# Patient Record
Sex: FEMALE | Race: WHITE | Employment: OTHER | URBAN - METROPOLITAN AREA
[De-identification: names, ages, dates, MRNs, and addresses within clinical notes are randomized per-mention and may not be internally consistent; named-entity substitution may affect disease eponyms.]

---

## 2017-01-18 DIAGNOSIS — I10 ESSENTIAL HYPERTENSION, MALIGNANT: ICD-10-CM

## 2017-01-18 DIAGNOSIS — I51.9 MYXEDEMA HEART DISEASE: Primary | ICD-10-CM

## 2017-01-18 DIAGNOSIS — E03.9 MYXEDEMA HEART DISEASE: Primary | ICD-10-CM

## 2017-01-18 DIAGNOSIS — E55.9 VITAMIN D DEFICIENCY: ICD-10-CM

## 2017-05-19 ENCOUNTER — HOSPITAL ENCOUNTER (EMERGENCY)
Facility: CLINIC | Age: 82
Discharge: HOME | End: 2017-05-20
Attending: EMERGENCY MEDICINE | Admitting: EMERGENCY MEDICINE
Payer: COMMERCIAL

## 2017-05-19 ENCOUNTER — APPOINTMENT (OUTPATIENT)
Dept: GENERAL RADIOLOGY | Facility: CLINIC | Age: 82
End: 2017-05-19
Attending: EMERGENCY MEDICINE
Payer: COMMERCIAL

## 2017-05-19 VITALS
OXYGEN SATURATION: 91 % | DIASTOLIC BLOOD PRESSURE: 96 MMHG | RESPIRATION RATE: 22 BRPM | TEMPERATURE: 97.8 F | SYSTOLIC BLOOD PRESSURE: 147 MMHG | HEIGHT: 66 IN | WEIGHT: 148 LBS | BODY MASS INDEX: 23.78 KG/M2

## 2017-05-19 DIAGNOSIS — J20.9 ACUTE BRONCHITIS, UNSPECIFIED ORGANISM: ICD-10-CM

## 2017-05-19 DIAGNOSIS — R30.0 DYSURIA: ICD-10-CM

## 2017-05-19 LAB
ALBUMIN SERPL-MCNC: 3.7 G/DL (ref 3.4–5)
ALBUMIN UR-MCNC: NEGATIVE MG/DL
ALP SERPL-CCNC: 61 U/L (ref 40–150)
ALT SERPL W P-5'-P-CCNC: 20 U/L (ref 0–50)
ANION GAP SERPL CALCULATED.3IONS-SCNC: 11 MMOL/L (ref 3–14)
APPEARANCE UR: CLEAR
AST SERPL W P-5'-P-CCNC: 27 U/L (ref 0–45)
BACTERIA #/AREA URNS HPF: ABNORMAL /HPF
BASOPHILS # BLD AUTO: 0.1 10E9/L (ref 0–0.2)
BASOPHILS NFR BLD AUTO: 0.9 %
BILIRUB SERPL-MCNC: 0.3 MG/DL (ref 0.2–1.3)
BILIRUB UR QL STRIP: NEGATIVE
BUN SERPL-MCNC: 21 MG/DL (ref 7–30)
CALCIUM SERPL-MCNC: 8.7 MG/DL (ref 8.5–10.1)
CHLORIDE SERPL-SCNC: 104 MMOL/L (ref 94–109)
CO2 SERPL-SCNC: 27 MMOL/L (ref 20–32)
COLOR UR AUTO: ABNORMAL
CREAT SERPL-MCNC: 1.07 MG/DL (ref 0.52–1.04)
CRP SERPL-MCNC: 48.3 MG/L (ref 0–8)
DIFFERENTIAL METHOD BLD: ABNORMAL
EOSINOPHIL # BLD AUTO: 0.1 10E9/L (ref 0–0.7)
EOSINOPHIL NFR BLD AUTO: 1.3 %
ERYTHROCYTE [DISTWIDTH] IN BLOOD BY AUTOMATED COUNT: 12.5 % (ref 10–15)
GFR SERPL CREATININE-BSD FRML MDRD: 49 ML/MIN/1.7M2
GLUCOSE SERPL-MCNC: 169 MG/DL (ref 70–99)
GLUCOSE UR STRIP-MCNC: NEGATIVE MG/DL
HCT VFR BLD AUTO: 33.6 % (ref 35–47)
HGB BLD-MCNC: 10.7 G/DL (ref 11.7–15.7)
HGB UR QL STRIP: ABNORMAL
IMM GRANULOCYTES # BLD: 0 10E9/L (ref 0–0.4)
IMM GRANULOCYTES NFR BLD: 0.1 %
KETONES UR STRIP-MCNC: NEGATIVE MG/DL
LACTATE BLD-SCNC: 3 MMOL/L (ref 0.7–2.1)
LEUKOCYTE ESTERASE UR QL STRIP: ABNORMAL
LYMPHOCYTES # BLD AUTO: 1.7 10E9/L (ref 0.8–5.3)
LYMPHOCYTES NFR BLD AUTO: 24.1 %
MCH RBC QN AUTO: 31.9 PG (ref 26.5–33)
MCHC RBC AUTO-ENTMCNC: 31.8 G/DL (ref 31.5–36.5)
MCV RBC AUTO: 100 FL (ref 78–100)
MONOCYTES # BLD AUTO: 0.9 10E9/L (ref 0–1.3)
MONOCYTES NFR BLD AUTO: 12.9 %
NEUTROPHILS # BLD AUTO: 4.2 10E9/L (ref 1.6–8.3)
NEUTROPHILS NFR BLD AUTO: 60.7 %
NITRATE UR QL: NEGATIVE
PH UR STRIP: 6 PH (ref 5–7)
PLATELET # BLD AUTO: 227 10E9/L (ref 150–450)
POTASSIUM SERPL-SCNC: 3.1 MMOL/L (ref 3.4–5.3)
PROT SERPL-MCNC: 7.1 G/DL (ref 6.8–8.8)
RBC # BLD AUTO: 3.35 10E12/L (ref 3.8–5.2)
RBC #/AREA URNS AUTO: 1 /HPF (ref 0–2)
SODIUM SERPL-SCNC: 142 MMOL/L (ref 133–144)
SP GR UR STRIP: 1 (ref 1–1.03)
SQUAMOUS #/AREA URNS AUTO: <1 /HPF (ref 0–1)
TROPONIN I SERPL-MCNC: 0.02 UG/L (ref 0–0.04)
URN SPEC COLLECT METH UR: ABNORMAL
UROBILINOGEN UR STRIP-MCNC: 0 MG/DL (ref 0–2)
WBC # BLD AUTO: 6.9 10E9/L (ref 4–11)
WBC #/AREA URNS AUTO: 23 /HPF (ref 0–2)

## 2017-05-19 PROCEDURE — 86140 C-REACTIVE PROTEIN: CPT | Performed by: EMERGENCY MEDICINE

## 2017-05-19 PROCEDURE — 87186 SC STD MICRODIL/AGAR DIL: CPT | Performed by: EMERGENCY MEDICINE

## 2017-05-19 PROCEDURE — 81001 URINALYSIS AUTO W/SCOPE: CPT | Performed by: EMERGENCY MEDICINE

## 2017-05-19 PROCEDURE — 25000132 ZZH RX MED GY IP 250 OP 250 PS 637: Performed by: EMERGENCY MEDICINE

## 2017-05-19 PROCEDURE — 80053 COMPREHEN METABOLIC PANEL: CPT | Performed by: EMERGENCY MEDICINE

## 2017-05-19 PROCEDURE — 96360 HYDRATION IV INFUSION INIT: CPT | Performed by: EMERGENCY MEDICINE

## 2017-05-19 PROCEDURE — 94640 AIRWAY INHALATION TREATMENT: CPT | Performed by: EMERGENCY MEDICINE

## 2017-05-19 PROCEDURE — 85025 COMPLETE CBC W/AUTO DIFF WBC: CPT | Performed by: EMERGENCY MEDICINE

## 2017-05-19 PROCEDURE — 99284 EMERGENCY DEPT VISIT MOD MDM: CPT | Mod: 25 | Performed by: EMERGENCY MEDICINE

## 2017-05-19 PROCEDURE — 87088 URINE BACTERIA CULTURE: CPT | Performed by: EMERGENCY MEDICINE

## 2017-05-19 PROCEDURE — 83605 ASSAY OF LACTIC ACID: CPT | Performed by: EMERGENCY MEDICINE

## 2017-05-19 PROCEDURE — 96361 HYDRATE IV INFUSION ADD-ON: CPT | Performed by: EMERGENCY MEDICINE

## 2017-05-19 PROCEDURE — 87040 BLOOD CULTURE FOR BACTERIA: CPT | Mod: 91 | Performed by: EMERGENCY MEDICINE

## 2017-05-19 PROCEDURE — 84484 ASSAY OF TROPONIN QUANT: CPT | Performed by: EMERGENCY MEDICINE

## 2017-05-19 PROCEDURE — 87086 URINE CULTURE/COLONY COUNT: CPT | Performed by: EMERGENCY MEDICINE

## 2017-05-19 PROCEDURE — 71020 XR CHEST 2 VW: CPT | Mod: TC

## 2017-05-19 PROCEDURE — 99284 EMERGENCY DEPT VISIT MOD MDM: CPT | Mod: Z6 | Performed by: EMERGENCY MEDICINE

## 2017-05-19 PROCEDURE — 25000128 H RX IP 250 OP 636: Performed by: EMERGENCY MEDICINE

## 2017-05-19 PROCEDURE — 25000125 ZZHC RX 250: Performed by: EMERGENCY MEDICINE

## 2017-05-19 RX ORDER — CETIRIZINE HYDROCHLORIDE 10 MG/1
10 TABLET ORAL EVERY EVENING
Qty: 30 TABLET | Refills: 1 | Status: SHIPPED | OUTPATIENT
Start: 2017-05-19 | End: 2019-11-24

## 2017-05-19 RX ORDER — BENZONATATE 200 MG/1
200 CAPSULE ORAL 3 TIMES DAILY PRN
Qty: 21 CAPSULE | Refills: 0 | Status: SHIPPED | OUTPATIENT
Start: 2017-05-19 | End: 2017-05-19

## 2017-05-19 RX ORDER — IPRATROPIUM BROMIDE AND ALBUTEROL SULFATE 2.5; .5 MG/3ML; MG/3ML
3 SOLUTION RESPIRATORY (INHALATION) ONCE
Status: COMPLETED | OUTPATIENT
Start: 2017-05-19 | End: 2017-05-19

## 2017-05-19 RX ORDER — SODIUM CHLORIDE, SODIUM LACTATE, POTASSIUM CHLORIDE, CALCIUM CHLORIDE 600; 310; 30; 20 MG/100ML; MG/100ML; MG/100ML; MG/100ML
INJECTION, SOLUTION INTRAVENOUS CONTINUOUS
Status: DISCONTINUED | OUTPATIENT
Start: 2017-05-19 | End: 2017-05-19

## 2017-05-19 RX ORDER — LIDOCAINE 40 MG/G
CREAM TOPICAL
Status: DISCONTINUED | OUTPATIENT
Start: 2017-05-19 | End: 2017-05-20 | Stop reason: HOSPADM

## 2017-05-19 RX ORDER — CEFDINIR 300 MG/1
300 CAPSULE ORAL 2 TIMES DAILY
Qty: 14 CAPSULE | Refills: 0 | Status: SHIPPED | OUTPATIENT
Start: 2017-05-19 | End: 2017-05-26

## 2017-05-19 RX ORDER — CODEINE PHOSPHATE AND GUAIFENESIN 10; 100 MG/5ML; MG/5ML
1-2 SOLUTION ORAL EVERY 4 HOURS PRN
Qty: 420 ML | Refills: 1 | Status: SHIPPED | OUTPATIENT
Start: 2017-05-19 | End: 2019-11-24

## 2017-05-19 RX ORDER — POTASSIUM CHLORIDE 1500 MG/1
40 TABLET, EXTENDED RELEASE ORAL ONCE
Status: COMPLETED | OUTPATIENT
Start: 2017-05-19 | End: 2017-05-19

## 2017-05-19 RX ADMIN — POTASSIUM CHLORIDE 40 MEQ: 1500 TABLET, EXTENDED RELEASE ORAL at 22:30

## 2017-05-19 RX ADMIN — IPRATROPIUM BROMIDE AND ALBUTEROL SULFATE 3 ML: .5; 3 SOLUTION RESPIRATORY (INHALATION) at 21:19

## 2017-05-19 RX ADMIN — SODIUM CHLORIDE 2013 ML: 9 INJECTION, SOLUTION INTRAVENOUS at 21:31

## 2017-05-19 ASSESSMENT — ENCOUNTER SYMPTOMS
DIAPHORESIS: 1
SHORTNESS OF BREATH: 1
COUGH: 1
CHILLS: 1

## 2017-05-19 NOTE — ED AVS SNAPSHOT
[facility] Emergency Department    [address] [place] [address]    Phone:  [phone]    Fax:  [phone]                                       Estela Cabrera   MRN: [id number]    Department:  [facility] Emergency Department   Date of Visit:  5/19/2017           After Visit Summary Signature Page     I have received my discharge instructions, and my questions have been answered. I have discussed any challenges I see with this plan with the nurse or doctor.    ..........................................................................................................................................  Patient/Patient Representative Signature      ..........................................................................................................................................  Patient Representative Print Name and Relationship to Patient    ..................................................               ................................................  Date                                            Time    ..........................................................................................................................................  Reviewed by Signature/Title    ...................................................              ..............................................  Date                                                            Time

## 2017-05-19 NOTE — ED AVS SNAPSHOT
[facility] Emergency Department    [address] [place] [address]    Phone:  [phone]    Fax:  [phone]                                       Estela Cabrera   MRN: [id number]    Department:  [facility] Emergency Department   Date of Visit:  5/19/2017           Patient Information     Date Of Birth          1935        Your diagnoses for this visit were:     Acute bronchitis, unspecified organism     Dysuria        You were seen by Scott Smith MD.      Follow-up Information     Follow up with Clinic, [facility].    Why:  As needed    Contact information:    [phone]          Discharge Instructions         Bronchitis, Viral (Adult)    You have a viral bronchitis. Bronchitis is inflammation and swelling of the lining of the lungs. This is often caused by an infection. Symptoms include a dry, hacking cough that is worse at night. The cough may bring up yellow-green mucus. You may also feel short of breath or wheeze. Other symptoms may include tiredness, chest discomfort, and chills.  Bronchitis that is caused by a virus is not treated with antibiotics. Instead, medicines may be given to help relieve symptoms. Symptoms can last up to 2 weeks, although the cough may last much longer.  This illness is contagious during the first few days and is spread through the air by coughing and sneezing, or by direct contact (touching the sick person and then touching your own eyes, nose, or mouth).  Most viral illnesses resolve within 10 to 14 days with rest and simple home remedies, although they may sometimes last for several weeks.  Home care    If symptoms are severe, rest at home for the first 2 to 3 days. When you go back to your usual activities, don't let yourself get too tired.    Do not smoke. Also avoid being exposed to secondhand smoke.    You may use over-the-counter medicine to control fever or pain, unless another pain medicine was  prescribed. (Note: If you have chronic liver or kidney disease or have ever had a stomach ulcer or gastrointestinal bleeding, talk with your healthcare provider before using these medicines. Also talk to your provider if you are taking medicine to prevent blood clots.) Aspirin should never be given to anyone younger than 18 years of age who is ill with a viral infection or fever. It may cause severe liver or brain damage.    Your appetite may be poor, so a light diet is fine. Avoid dehydration by drinking 6 to 8 glasses of fluids per day (such as water, soft drinks, sports drinks, juices, tea, or soup). Extra fluids will help loosen secretions in the nose and lungs.    Over-the-counter cough, cold, and sore-throat medicines will not shorten the length of the illness, but they may help to reduce symptoms. (Note: Do not use decongestants if you have high blood pressure.)  Follow-up care  Follow up with your healthcare provider, or as advised.  If you had an X-ray or ECG (electrocardiogram), a specialist will review it. You will be notified of any new findings that may affect your care.  Note: If you are age 65 or older, or if you have a chronic lung disease or condition that affects your immune system, or you smoke, talk to your healthcare provider about having pneumococcal vaccinations and a yearly influenza vaccination (flu shot).  When to seek medical advice   Call your healthcare provider right away if any of these occur:    Fever of 100.4 F (38 C) or higher    Coughing up increased amounts of colored sputum    Weakness, drowsiness, headache, facial pain, ear pain, or a stiff neck  Call 911, or get immediate medical care  Contact emergency services right away if any of these occur:    Coughing up blood    Worsening weakness, drowsiness, headache, or stiff neck    Trouble breathing, wheezing, or pain with breathing    2233-4220 [employer]. [address] [place] [address], [place] [address]. All rights  "reserved. This information is not intended as a substitute for professional medical care. Always follow your healthcare professional's instructions.          Dysuria  Dysuria is pain felt during urination. It is often described as a burning. Learn more about this problem and how it can be treated.     Painful urination (dysuria) is often caused by a problem in the urinary tract.   What causes dysuria?  Possible causes include:    Infection with a bacteria or virus. This can be a urinary tract infection (UTI). Or it may be a sexually transmitted infection (STI).    Sensitivity or allergy to chemicals. These chemicals are found in lotions and other products.    Prostate or bladder problems    Radiation therapy to the pelvic area  How is dysuria diagnosed?  Your health care provider will examine you. He or she will ask about your symptoms and health. After talking with you and doing a physical exam, your health care provider may know what is causing your dysuria. He or she will usually request  a sample of your urine. Tests of your urine, or a \"urinalysis,\" are done. A urinalysis may include:    Looking at the urine sample (visual exam)    Checking for substances (chemical exam)    Checking a small amount under a microscope (microscopic exam)  Some parts of the urinalysis may be done in the provider's office and some in a lab. And, the urine sample may be checked for bacteria and yeast (urine culture). Your health care provider will tell you more about these tests if they are needed.  How is dysuria treated?  Treatment depends on the cause. If you have a bacterial infection, you may need antibiotics. You may be given medications to make it easier for you to urinate and help relieve pain. Your health care provider can tell you more about your treatment options. Untreated, symptoms may get worse.  Call the health care provider right away if you have any of the following:    Fever of 100.4 F (38 C) or higher     No " improvement after three days of treatment    Trouble urinating because of pain    New or increased discharge from the vagina or penis    Rash or joint pain    Increased back or abdominal pain    Enlarged painful lymph nodes (lumps) in the groin     9394-8716 [employer]. [address] [place] [address], [place] [address]. All rights reserved. This information is not intended as a substitute for professional medical care. Always follow your healthcare professional's instructions.          24 Hour Appointment Hotline       To make an appointment at any [facility], call [phone]). If you don't have a family doctor or clinic, we will help you find one. [facility] clinics are conveniently located to serve the needs of you and your family.             Review of your medicines      START taking        Dose / Directions Last dose taken    cefdinir 300 MG capsule   Commonly known as:  OMNICEF   Dose:  300 mg   Quantity:  14 capsule        Take 1 capsule (300 mg) by mouth 2 times daily for 7 days   Refills:  0        cetirizine 10 MG tablet   Commonly known as:  zyrTEC   Dose:  10 mg   Quantity:  30 tablet        Take 1 tablet (10 mg) by mouth every evening   Refills:  1        guaiFENesin-codeine 100-10 MG/5ML Soln solution   Commonly known as:  ROBITUSSIN AC   Dose:  1-2 tsp.   Quantity:  420 mL        Take 5-10 mLs by mouth every 4 hours as needed for cough   Refills:  1          Our records show that you are taking the medicines listed below. If these are incorrect, please call your family doctor or clinic.        Dose / Directions Last dose taken    ascorbic acid 1000 MG Tabs   Commonly known as:  vitamin C   Dose:  1 tablet        Take 1 tablet by mouth   Refills:  0        Butalbital-Acetaminophen  MG Tabs per tablet   Commonly known as:  PHRENILIN   Dose:  2 tablet        Take 2 tablets by mouth every 4 hours as needed.   Refills:  0        EQL NATURAL ZINC 50 MG Tabs   Dose:  1  tablet        Take 1 tablet by mouth   Refills:  0        GNP VITAMIN D-400  MG-UNIT Tabs   Dose:  1 tablet   Generic drug:  Calcium Carb-Cholecalciferol        Take 1 tablet by mouth   Refills:  0        levothyroxine 150 MCG tablet   Commonly known as:  SYNTHROID/LEVOTHROID   Dose:  150 mcg        Take 150 mcg by mouth   Refills:  0        meclizine 12.5 MG tablet   Commonly known as:  ANTIVERT        Take one tablet by mouth as needed for dizziness   Refills:  0        MULTI-VITAMINS Tabs   Dose:  1 tablet        Take 1 tablet by mouth   Refills:  0        NORVASC 2.5 MG tablet   Generic drug:  amLODIPine        Take 1 tablet (2.5mg) twice a day   Refills:  0        replens Gel   Quantity:  45 g        Place  Vaginally twice weekly   Refills:  1        triamterene-hydrochlorothiazide 37.5-25 MG per capsule   Commonly known as:  DYAZIDE   Dose:  1 capsule        Take 1 capsule by mouth   Refills:  0        vitamin E 1000 UNIT capsule   Commonly known as:  TOCOPHEROL   Dose:  1 tablet        Take 1 tablet by mouth   Refills:  0        warfarin 5 MG tablet   Commonly known as:  COUMADIN        Take one and one half tablets (total dose of 7.5 mg) for three days then one tablet (5 mg) for one day. Repeat in four day cycles.   Refills:  0                Prescriptions were sent or printed at these locations (3 Prescriptions)                   [facility]   [address] [address]    Telephone:  [phone]   Fax:  [phone]   Hours:                  Printed at Department/Unit printer (1 of 3)         guaiFENesin-codeine (ROBITUSSIN AC) 100-10 MG/5ML SOLN solution                 These medications are not ready yet because we are checking if your insurance will help you pay for them. Call your pharmacy to confirm that your medication is ready for pickup. It may take up to 24 hours for them to receive the prescription. If the prescription is not ready within 3  business days, please contact your clinic or your provider (2 of 3)         cefdinir (OMNICEF) 300 MG capsule               cetirizine (ZYRTEC) 10 MG tablet                Procedures and tests performed during your visit     Procedure/Test Number of Times Performed    Blood culture 2    CBC with platelets differential 1    CRP inflammation 1    Cardiac Continuous Monitoring 1    Comprehensive metabolic panel 1    Lactic acid whole blood 1    Measure urine output 1    Non indwelling bladder catheter (Quick cath) 1    Patient care order 1    Peripheral IV catheter 1    Pulse oximetry nursing 1    Troponin I 1    UA with Microscopic 1    Urine Culture Aerobic Bacterial 1    XR Chest 2 Views 1      Orders Needing Specimen Collection     None      Pending Results     Date and Time Order Name Status Description    5/19/2017 2107 Blood culture In process     5/19/2017 2107 Blood culture In process             Pending Culture Results     Date and Time Order Name Status Description    5/19/2017 2107 Blood culture In process     5/19/2017 2107 Blood culture In process             Pending Results Instructions     If you had any lab results that were not finalized at the time of your Discharge, you can call the ED Lab Result RN at [phone]. You will be contacted by this team for any positive Lab results or changes in treatment. The nurses are available 7 days a week from 10A to 6:30P.  You can leave a message 24 hours per day and they will return your call.        Thank you for choosing [facility]       Thank you for choosing [facility] for your care. Our goal is always to provide you with excellent care. Hearing back from our patients is one way we can continue to improve our services. Please take a few minutes to complete the written survey that you may receive in the mail after you visit with us. Thank you!        [employer] Information     [employer] lets you send messages to your doctor, view your test results, renew your  "prescriptions, schedule appointments and more. To sign up, go to [email or web address] . Click on \"Log in\" on the left side of the screen, which will take you to the Welcome page. Then click on \"Sign up Now\" on the right side of the page.     You will be asked to enter the access code listed below, as well as some personal information. Please follow the directions to create your username and password.     Your access code is: [email or web address]  Expires: 2017 12:16 AM     Your access code will  in 90 days. If you need help or a new code, please call your [facility] clinic or [phone].        Care EveryWhere ID     This is your Care EveryWhere ID. This could be used by other organizations to access your [facility] medical records  [id number]        After Visit Summary       This is your record. Keep this with you and show to your community pharmacist(s) and doctor(s) at your next visit.                  "

## 2017-05-20 NOTE — ED PROVIDER NOTES
History     Chief Complaint   Patient presents with     Cough     Shortness of Breath     The history is provided by the patient and the spouse.     Estela Cabrera is a 82 year old female presents to the emergency department with a cough and shortness of breath.  Patient states that she started with a cough on Saturday and has been worsening since that time.  Last night and this morning, she reports to have been coughing all day and almost passed out an hour and a half ago from coughing.  Patient states that she was profusely sweating, has terrible chills, and has used a couple of Aleve without any relief. Patient has lost her voice.  notes that the patient used a nebulizer which helped her shortness of breath.  is also sick with a cough.      I have reviewed the Medications, Allergies, Past Medical and Surgical History, and Social History in the Epic system.    Patient Active Problem List   Diagnosis     Migraine headache     Gastritis     HTN, goal below 140/90     CARDIOVASCULAR SCREENING; LDL GOAL LESS THAN 130     Past Medical History:   Diagnosis Date     Atrial fibrillation (H)      Diverticulitis      High blood pressure      Thyroid disease      Vertigo        Past Surgical History:   Procedure Laterality Date     ankle surgeries       HYSTERECTOMY       TONSILLECTOMY         Family History   Problem Relation Age of Onset     Hypertension Mother      Thyroid Disease Mother      Macular Degeneration Mother      Hypertension Sister      Thyroid Disease Sister      Hypertension Daughter      Thyroid Disease Daughter      Glaucoma No family hx of        Social History   Substance Use Topics     Smoking status: Never Smoker     Smokeless tobacco: Never Used     Alcohol use No          There is no immunization history on file for this patient.     No Known Allergies    Current Outpatient Prescriptions   Medication Sig Dispense Refill     cefdinir (OMNICEF) 300 MG capsule Take 1 capsule (300 mg)  "by mouth 2 times daily for 7 days 14 capsule 0     guaiFENesin-codeine (ROBITUSSIN AC) 100-10 MG/5ML SOLN solution Take 5-10 mLs by mouth every 4 hours as needed for cough 420 mL 1     cetirizine (ZYRTEC) 10 MG tablet Take 1 tablet (10 mg) by mouth every evening 30 tablet 1     amLODIPine (NORVASC) 2.5 MG tablet Take 1 tablet (2.5mg) twice a day       ascorbic acid (VITAMIN C) 1000 MG TABS Take 1 tablet by mouth       Calcium Carb-Cholecalciferol (GNP VITAMIN D-400)  MG-UNIT TABS Take 1 tablet by mouth       meclizine (ANTIVERT) 12.5 MG tablet Take one tablet by mouth as needed for dizziness       Multiple Vitamin (MULTI-VITAMINS) TABS Take 1 tablet by mouth       triamterene-hydrochlorothiazide (DYAZIDE) 37.5-25 MG per capsule Take 1 capsule by mouth       vitamin E (TOCOPHEROL) 1000 UNIT capsule Take 1 tablet by mouth       EQL NATURAL ZINC 50 MG TABS Take 1 tablet by mouth       levothyroxine (SYNTHROID, LEVOTHROID) 150 MCG tablet Take 150 mcg by mouth       warfarin (COUMADIN) 5 MG tablet Take one and one half tablets (total dose of 7.5 mg) for three days then one tablet (5 mg) for one day. Repeat in four day cycles.       Butalbital-Acetaminophen (PHRENILIN)  MG TABS Take 2 tablets by mouth every 4 hours as needed.       Vaginal Lubricant (REPLENS) GEL Place  Vaginally twice weekly 45 g 1       Review of Systems   Constitutional: Positive for chills and diaphoresis.   Respiratory: Positive for cough and shortness of breath.    All other systems reviewed and are negative.      Physical Exam   BP: (!) 176/100  Heart Rate: 99  Temp: 97.8  F (36.6  C)  Resp: 22  Height: 167.6 cm (5' 6\")  Weight: 67.1 kg (148 lb)  SpO2: 97 %  Physical Exam   Constitutional: She is oriented to person, place, and time. She appears well-developed and well-nourished.   HENT:   Head: Normocephalic and atraumatic.   Nasal congestion and erythema   Eyes: Conjunctivae and EOM are normal.   Neck: Normal range of motion. Neck " supple.   Cardiovascular: Normal rate, normal heart sounds and intact distal pulses.  An irregularly irregular rhythm present.   Pulmonary/Chest: She has wheezes (scattered). She has rhonchi in the right lower field and the left lower field.   Egophony in right base   Musculoskeletal: Normal range of motion.   Neurological: She is alert and oriented to person, place, and time.   Skin: Skin is warm and dry.   Psychiatric: She has a normal mood and affect. Her behavior is normal.   Nursing note and vitals reviewed.      ED Course     ED Course     Procedures          Lactate is greater than 2 due to Bronchospasm, albuterol, and cough, at this time there is no sign of sepsis.        Results for orders placed or performed during the hospital encounter of 05/19/17 (from the past 24 hour(s))   CBC with platelets differential   Result Value Ref Range    WBC 6.9 4.0 - 11.0 10e9/L    RBC Count 3.35 (L) 3.8 - 5.2 10e12/L    Hemoglobin 10.7 (L) 11.7 - 15.7 g/dL    Hematocrit 33.6 (L) 35.0 - 47.0 %     78 - 100 fl    MCH 31.9 26.5 - 33.0 pg    MCHC 31.8 31.5 - 36.5 g/dL    RDW 12.5 10.0 - 15.0 %    Platelet Count 227 150 - 450 10e9/L    Diff Method Automated Method     % Neutrophils 60.7 %    % Lymphocytes 24.1 %    % Monocytes 12.9 %    % Eosinophils 1.3 %    % Basophils 0.9 %    % Immature Granulocytes 0.1 %    Absolute Neutrophil 4.2 1.6 - 8.3 10e9/L    Absolute Lymphocytes 1.7 0.8 - 5.3 10e9/L    Absolute Monocytes 0.9 0.0 - 1.3 10e9/L    Absolute Eosinophils 0.1 0.0 - 0.7 10e9/L    Absolute Basophils 0.1 0.0 - 0.2 10e9/L    Abs Immature Granulocytes 0.0 0 - 0.4 10e9/L   Comprehensive metabolic panel   Result Value Ref Range    Sodium 142 133 - 144 mmol/L    Potassium 3.1 (L) 3.4 - 5.3 mmol/L    Chloride 104 94 - 109 mmol/L    Carbon Dioxide 27 20 - 32 mmol/L    Anion Gap 11 3 - 14 mmol/L    Glucose 169 (H) 70 - 99 mg/dL    Urea Nitrogen 21 7 - 30 mg/dL    Creatinine 1.07 (H) 0.52 - 1.04 mg/dL    GFR Estimate 49 (L)  >60 mL/min/1.7m2    GFR Estimate If Black 60 (L) >60 mL/min/1.7m2    Calcium 8.7 8.5 - 10.1 mg/dL    Bilirubin Total 0.3 0.2 - 1.3 mg/dL    Albumin 3.7 3.4 - 5.0 g/dL    Protein Total 7.1 6.8 - 8.8 g/dL    Alkaline Phosphatase 61 40 - 150 U/L    ALT 20 0 - 50 U/L    AST 27 0 - 45 U/L   Troponin I   Result Value Ref Range    Troponin I ES 0.017 0.000 - 0.045 ug/L   Lactic acid whole blood   Result Value Ref Range    Lactic Acid 3.0 (H) 0.7 - 2.1 mmol/L   CRP inflammation   Result Value Ref Range    CRP Inflammation 48.3 (H) 0.0 - 8.0 mg/L   XR Chest 2 Views    Narrative    CHEST TWO VIEWS  5/19/2017  9:14 PM     COMPARISON: None.    HISTORY: Fever, cough, shortness of air.      FINDINGS: The cardiac silhouette, pulmonary vasculature, lungs and  pleural spaces are within normal limits.      Impression    IMPRESSION: Clear lungs.    LAZ PHELAN MD   UA with Microscopic   Result Value Ref Range    Color Urine Straw     Appearance Urine Clear     Glucose Urine Negative NEG mg/dL    Bilirubin Urine Negative NEG    Ketones Urine Negative NEG mg/dL    Specific Gravity Urine 1.004 1.003 - 1.035    Blood Urine Small (A) NEG    pH Urine 6.0 5.0 - 7.0 pH    Protein Albumin Urine Negative NEG mg/dL    Urobilinogen mg/dL 0.0 0.0 - 2.0 mg/dL    Nitrite Urine Negative NEG    Leukocyte Esterase Urine Moderate (A) NEG    Source Unspecified Urine     WBC Urine 23 (H) 0 - 2 /HPF    RBC Urine 1 0 - 2 /HPF    Bacteria Urine Few (A) NEG /HPF    Squamous Epithelial /HPF Urine <1 0 - 1 /HPF     Medications   lidocaine 1 % 1 mL (not administered)   lidocaine (LMX4) kit (not administered)   sodium chloride (PF) 0.9% PF flush 3 mL (not administered)   sodium chloride (PF) 0.9% PF flush 3 mL (3 mLs Intracatheter Not Given 5/20/17 0013)   ipratropium - albuterol 0.5 mg/2.5 mg/3 mL (DUONEB) neb solution 3 mL (3 mLs Nebulization Given 5/19/17 2119)   0.9% sodium chloride BOLUS (0 mLs Intravenous Stopped 5/20/17 0013)   potassium chloride SA  (K-DUR/KLOR-CON M) CR tablet 40 mEq (40 mEq Oral Given 5/19/17 2230)       Assessments & Plan (with Medical Decision Making)  Estela Cabrera is an 82 year-old female presents to the ED for evaluation of a cough and shortness of breath that started 1 week ago, but not associated with fever or chills.  She's been experiencing paroxysmal coughing to the point where she nearly passes out.  This has become more significant over the last 24 hours with 3 episodes.  The cough is mostly been nonproductive.  She also has significant hoarseness of her voice.  On examination, the patient has a rapid irregular heart rate with a known history for atrial fibrillation.  She is anticoagulated with Coumadin for this.  She has scattered wheezes throughout both lung fields and rhonchi in the bases, however, both labs and x-ray failed to demonstrate any significant abnormalities to suggest either a bronchitis or a pneumonia.  The remainder of her exam is unremarkable.  The only abnormality I'm able to find besides a lactate of 3.0 is urinalysis which shows moderate leukocyte esterase seen 23 white cells with 1 red blood cell on microscopy.  Overall, she probably has a viral bronchitis causing bronchospasm and dysuria evidenced by the pyuria.  We did an ambulatory oximetry which she maintained levels greater than 92% while walking around on room air.  We will start her on Omnicef for the UTI as this is less likely to cause interference with her warfarin and anticoagulation.  In addition, I have given her prescription for Robitussin-AC for nighttime use and cetirizine daily as a believe allergic rhinitis is playing a significant role here.  I did review with the patient indications return to the ED for reevaluation.  All questions from the patient were answered and she was suitable for discharge in satisfactory condition.       I have reviewed the nursing notes.    I have reviewed the findings, diagnosis, plan and need for follow up with  the patient.    Discharge Medication List as of 5/20/2017 12:16 AM      START taking these medications    Details   cefdinir (OMNICEF) 300 MG capsule Take 1 capsule (300 mg) by mouth 2 times daily for 7 days, Disp-14 capsule, R-0, E-Prescribe      guaiFENesin-codeine (ROBITUSSIN AC) 100-10 MG/5ML SOLN solution Take 5-10 mLs by mouth every 4 hours as needed for cough, Disp-420 mL, R-1, Local Print      cetirizine (ZYRTEC) 10 MG tablet Take 1 tablet (10 mg) by mouth every evening, Disp-30 tablet, R-1, E-Prescribe             Final diagnoses:   Acute bronchitis, unspecified organism   Dysuria     This document serves as a record of services personally performed by Scott Smith MD. It was created on their behalf by Sherrie Castro and Sascha Zurita, a trained medical scribe. The creation of this record is based on the provider's personal observations and the statements of the patient. This document has been checked and approved by the attending provider.     Note: Chart documentation done in part with Dragon Voice Recognition software. Although reviewed after completion, some word and grammatical errors may remain.    5/19/2017   [facility] EMERGENCY DEPARTMENT     Scott Smith MD  05/20/17 0115

## 2017-05-20 NOTE — ED NOTES
Saturday started with small cough and then last night it got worse.Shortness of air and coughing causing her to almost pass out

## 2017-05-20 NOTE — DISCHARGE INSTRUCTIONS
Bronchitis, Viral (Adult)    You have a viral bronchitis. Bronchitis is inflammation and swelling of the lining of the lungs. This is often caused by an infection. Symptoms include a dry, hacking cough that is worse at night. The cough may bring up yellow-green mucus. You may also feel short of breath or wheeze. Other symptoms may include tiredness, chest discomfort, and chills.  Bronchitis that is caused by a virus is not treated with antibiotics. Instead, medicines may be given to help relieve symptoms. Symptoms can last up to 2 weeks, although the cough may last much longer.  This illness is contagious during the first few days and is spread through the air by coughing and sneezing, or by direct contact (touching the sick person and then touching your own eyes, nose, or mouth).  Most viral illnesses resolve within 10 to 14 days with rest and simple home remedies, although they may sometimes last for several weeks.  Home care    If symptoms are severe, rest at home for the first 2 to 3 days. When you go back to your usual activities, don't let yourself get too tired.    Do not smoke. Also avoid being exposed to secondhand smoke.    You may use over-the-counter medicine to control fever or pain, unless another pain medicine was prescribed. (Note: If you have chronic liver or kidney disease or have ever had a stomach ulcer or gastrointestinal bleeding, talk with your healthcare provider before using these medicines. Also talk to your provider if you are taking medicine to prevent blood clots.) Aspirin should never be given to anyone younger than 18 years of age who is ill with a viral infection or fever. It may cause severe liver or brain damage.    Your appetite may be poor, so a light diet is fine. Avoid dehydration by drinking 6 to 8 glasses of fluids per day (such as water, soft drinks, sports drinks, juices, tea, or soup). Extra fluids will help loosen secretions in the nose and  lungs.    Over-the-counter cough, cold, and sore-throat medicines will not shorten the length of the illness, but they may help to reduce symptoms. (Note: Do not use decongestants if you have high blood pressure.)  Follow-up care  Follow up with your healthcare provider, or as advised.  If you had an X-ray or ECG (electrocardiogram), a specialist will review it. You will be notified of any new findings that may affect your care.  Note: If you are age 65 or older, or if you have a chronic lung disease or condition that affects your immune system, or you smoke, talk to your healthcare provider about having pneumococcal vaccinations and a yearly influenza vaccination (flu shot).  When to seek medical advice   Call your healthcare provider right away if any of these occur:    Fever of 100.4 F (38 C) or higher    Coughing up increased amounts of colored sputum    Weakness, drowsiness, headache, facial pain, ear pain, or a stiff neck  Call 911, or get immediate medical care  Contact emergency services right away if any of these occur:    Coughing up blood    Worsening weakness, drowsiness, headache, or stiff neck    Trouble breathing, wheezing, or pain with breathing    [phone] [employer]. [address] [place] [address]. All rights reserved. This information is not intended as a substitute for professional medical care. Always follow your healthcare professional's instructions.          Dysuria  Dysuria is pain felt during urination. It is often described as a burning. Learn more about this problem and how it can be treated.     Painful urination (dysuria) is often caused by a problem in the urinary tract.   What causes dysuria?  Possible causes include:    Infection with a bacteria or virus. This can be a urinary tract infection (UTI). Or it may be a sexually transmitted infection (STI).    Sensitivity or allergy to chemicals. These chemicals are found in lotions and other  "products.    Prostate or bladder problems    Radiation therapy to the pelvic area  How is dysuria diagnosed?  Your health care provider will examine you. He or she will ask about your symptoms and health. After talking with you and doing a physical exam, your health care provider may know what is causing your dysuria. He or she will usually request  a sample of your urine. Tests of your urine, or a \"urinalysis,\" are done. A urinalysis may include:    Looking at the urine sample (visual exam)    Checking for substances (chemical exam)    Checking a small amount under a microscope (microscopic exam)  Some parts of the urinalysis may be done in the provider's office and some in a lab. And, the urine sample may be checked for bacteria and yeast (urine culture). Your health care provider will tell you more about these tests if they are needed.  How is dysuria treated?  Treatment depends on the cause. If you have a bacterial infection, you may need antibiotics. You may be given medications to make it easier for you to urinate and help relieve pain. Your health care provider can tell you more about your treatment options. Untreated, symptoms may get worse.  Call the health care provider right away if you have any of the following:    Fever of 100.4 F (38 C) or higher     No improvement after three days of treatment    Trouble urinating because of pain    New or increased discharge from the vagina or penis    Rash or joint pain    Increased back or abdominal pain    Enlarged painful lymph nodes (lumps) in the groin     [phone] [employer]. [address] [place] [address], [place] [address]. All rights reserved. This information is not intended as a substitute for professional medical care. Always follow your healthcare professional's instructions.        "

## 2017-05-22 LAB
BACTERIA SPEC CULT: ABNORMAL
MICRO REPORT STATUS: ABNORMAL
MICROORGANISM SPEC CULT: ABNORMAL
SPECIMEN SOURCE: ABNORMAL

## 2017-05-25 LAB
BACTERIA SPEC CULT: NORMAL
BACTERIA SPEC CULT: NORMAL
MICRO REPORT STATUS: NORMAL
MICRO REPORT STATUS: NORMAL
SPECIMEN SOURCE: NORMAL
SPECIMEN SOURCE: NORMAL

## 2018-05-22 ENCOUNTER — OFFICE VISIT (OUTPATIENT)
Dept: OPHTHALMOLOGY | Facility: CLINIC | Age: 83
End: 2018-05-22
Payer: COMMERCIAL

## 2018-05-22 DIAGNOSIS — H04.123 DRY EYES, BILATERAL: ICD-10-CM

## 2018-05-22 DIAGNOSIS — H52.203 MYOPIC ASTIGMATISM OF BOTH EYES: ICD-10-CM

## 2018-05-22 DIAGNOSIS — H52.13 MYOPIC ASTIGMATISM OF BOTH EYES: ICD-10-CM

## 2018-05-22 DIAGNOSIS — Z96.1 PSEUDOPHAKIA, BOTH EYES: Primary | ICD-10-CM

## 2018-05-22 ASSESSMENT — REFRACTION_WEARINGRX
OD_CYLINDER: -1.75
OS_VBASE: UP
OS_HPRISM: 3.5
OS_VPRISM: 3.0
OS_CYLINDER: +1.25
OD_HPRISM: 3.5
OD_HBASE: OUT
OS_HBASE: OUT
OS_AXIS: 020
OD_ADD: +2.50
OD_SPHERE: -1.50
OD_AXIS: 010
OS_SPHERE: -1.00
OS_ADD: +2.50

## 2018-05-22 ASSESSMENT — VISUAL ACUITY
CORRECTION_TYPE: GLASSES
OS_CC+: +2
OS_CC: 20/40
OD_CC+: -2
METHOD: SNELLEN - LINEAR
OD_CC: 20/30

## 2018-05-22 ASSESSMENT — REFRACTION_MANIFEST
OS_ADD: +2.50
OS_AXIS: 030
OD_ADD: +2.50
OS_CYLINDER: +1.75
OD_CYLINDER: +2.50
OS_SPHERE: -1.00
OD_SPHERE: -1.25
OD_AXIS: 010

## 2018-05-22 ASSESSMENT — REFRACTION_FINALRX
OD_HBASE: OUT
OD_HPRISM: 3.5
OS_HBASE: OUT
OS_VPRISM: 3.0
OS_HPRISM: 3.5

## 2018-05-22 ASSESSMENT — SLIT LAMP EXAM - LIDS
COMMENTS: NORMAL
COMMENTS: NORMAL

## 2018-05-22 ASSESSMENT — CUP TO DISC RATIO
OS_RATIO: 0.3
OD_RATIO: 0.3

## 2018-05-22 ASSESSMENT — TONOMETRY
OD_IOP_MMHG: 13
OS_IOP_MMHG: 10
IOP_METHOD: TONOPEN

## 2018-05-22 ASSESSMENT — CONF VISUAL FIELD
OS_NORMAL: 1
OD_NORMAL: 1

## 2018-05-22 ASSESSMENT — EXTERNAL EXAM - LEFT EYE: OS_EXAM: NORMAL

## 2018-05-22 ASSESSMENT — EXTERNAL EXAM - RIGHT EYE: OD_EXAM: NORMAL

## 2018-05-22 NOTE — MR AVS SNAPSHOT
After Visit Summary   2018    Estela Cabrera    MRN: [id number]           Patient Information     Date Of Birth          1935        Visit Information        Provider Department      2018 10:00 AM Dominga Lopez MD [facility]        Today's Diagnoses     Pseudophakia, both eyes - Both Eyes    -  1    Myopic astigmatism of both eyes - Both Eyes        Dry eyes, bilateral - Both Eyes           Follow-ups after your visit        Follow-up notes from your care team     Return in about 1 year (around 2019) for Comprehensive Exam.      Who to contact     Please call your clinic at [phone] to:    Ask questions about your health    Make or cancel appointments    Discuss your medicines    Learn about your test results    Speak to your doctor            Additional Information About Your Visit        MyChart Information     [employer] is an electronic gateway that provides easy, online access to your medical records. With [employer]sect, you can request a clinic appointment, read your test results, renew a prescription or communicate with your care team.     To sign up for [employer] visit the website at [email or web address]   You will be asked to enter the access code listed below, as well as some personal information. Please follow the directions to create your username and password.     Your access code is: [email or web address]  Expires: 2018  6:30 AM     Your access code will  in 90 days. If you need help or a new code, please contact your [facility] or call [phone] for assistance.        Care EveryWhere ID     This is your Care EveryWhere ID. This could be used by other organizations to access your [facility] medical records  [id number]         Blood Pressure from Last 3 Encounters:   17 (!) 147/96   12/17/15 152/78   02/15/13 120/64    Weight from Last 3 Encounters:   17 67.1 kg (148 lb)    12/17/15 74 kg (163 lb 4 oz)   02/15/13 73.9 kg (162 lb 14.4 oz)              We Performed the Following     REFRACTION        Primary Care Provider Office Phone # Fax #    Navin Landa [phone]       MARLEEN PICHARDO MD [facility] [address] [address]        Equal Access to Services     [facility]: Hadii aad ku hadasho Soomaali, waaxda luqadaha, qaybta kaalmada adeegyada, waxay idiin hayaan adeeg kharash la'aan ah. So [facility] [phone].    ATENCIÓN: Si habla español, tiene a rubio disposición servicios gratuitos de asistencia lingüística. Rosy al [phone].    We comply with applicable federal civil rights laws and [place] laws. We do not discriminate on the basis of race, color, national origin, age, disability, sex, sexual orientation, or gender identity.            Thank you!     Thank you for choosing [facility]  for your care. Our goal is always to provide you with excellent care. Hearing back from our patients is one way we can continue to improve our services. Please take a few minutes to complete the written survey that you may receive in the mail after your visit with us. Thank you!             Your Updated Medication List - Protect others around you: Learn how to safely use, store and throw away your medicines at www.disposemymeds.org.          This list is accurate as of 5/22/18 11:59 PM.  Always use your most recent med list.                   Brand Name Dispense Instructions for use Diagnosis    ascorbic acid 1000 MG Tabs    vitamin C     Take 1 tablet by mouth        Butalbital-Acetaminophen  MG Tabs per tablet    PHRENILIN     Take 2 tablets by mouth every 4 hours as needed.        cetirizine 10 MG tablet    zyrTEC    30 tablet    Take 1 tablet (10 mg) by mouth every evening        EQL NATURAL ZINC 50 MG Tabs      Take 1 tablet by mouth        GNP VITAMIN D-400  MG-UNIT Tabs   Generic drug:  Calcium  Carb-Cholecalciferol      Take 1 tablet by mouth        guaiFENesin-codeine 100-10 MG/5ML Soln solution    ROBITUSSIN AC    420 mL    Take 5-10 mLs by mouth every 4 hours as needed for cough        levothyroxine 150 MCG tablet    SYNTHROID/LEVOTHROID     Take 150 mcg by mouth        meclizine 12.5 MG tablet    ANTIVERT     Take one tablet by mouth as needed for dizziness        METFORMIN HCL PO           MULTI-VITAMINS Tabs      Take 1 tablet by mouth        NORVASC 2.5 MG tablet   Generic drug:  amLODIPine      Take 1 tablet (2.5mg) twice a day        replens Gel     45 g    Place  Vaginally twice weekly    Vaginitis, Menopausal vaginal dryness       triamterene-hydrochlorothiazide 37.5-25 MG per capsule    DYAZIDE     Take 1 capsule by mouth        vitamin E 1000 UNIT capsule    TOCOPHEROL     Take 1 tablet by mouth        warfarin 5 MG tablet    COUMADIN     Take one and one half tablets (total dose of 7.5 mg) for three days then one tablet (5 mg) for one day. Repeat in four day cycles.

## 2018-05-22 NOTE — NURSING NOTE
Chief Complaints and History of Present Illnesses   Patient presents with     Blurred Vision Both Eyes     Per Patient     HPI    Affected eye(s):  Both   Symptoms:        Duration:  6 months   Frequency:  Constant       Do you have eye pain now?:  No      Comments:  H/O dry eyes, Refresh Artificial tears BID OU  Swetha Mendoza COT 9:51 AM May 22, 2018

## 2018-05-25 NOTE — PROGRESS NOTES
HPI  Estela Cabrera is a 83 year old female here for comprehensive eye exam. Mild blurry vision both eyes with current glasses. Doesn't seem to fluctuate much.      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, trauma left temple 2016 no eye sequela  Oc Meds:  Refresh Artificial tears BID OU       Assessment & Plan      (Z96.1) Pseudophakia, both eyes - Both Eyes  (primary encounter diagnosis)  Comment: posterior capsular opacity (PCO) not visually significant   Plan: follow    (H52.203,  H52.13) Myopic astigmatism of both eyes - Both Eyes  Comment: change improves visual acuity   Plan: manifest refraction done and prescription for glasses given     (H04.123) Dry eyes, bilateral - Both Eyes  Comment: moderate  Plan: continue artificial tear drops and increase as needed to 4-6 x per day     -----------------------------------------------------------------------------------    Patient disposition:   Return in about 1 year (around 5/22/2019) for Comprehensive Exam. Call for sooner appointment as needed.    Complete documentation of historical and exam elements from today's encounter can be found in the full encounter summary report (not reduplicated in this progress note). I personally obtained the chief complaint(s) and history of present illness.  I have confirmed and edited as necessary the CC, HPI, PMH/PSH, social history, FMH, ROS, and exam/neuro findings as obtained by the technician or others. I have examined this patient myself and I personally viewed the image(s) and studies listed above and the documentation reflects my findings and interpretation.  I formulated and edited as necessary the assessment and plan  and discussed the findings and management plan with the patient and family.     Dominga Lopez MD

## 2018-08-01 ENCOUNTER — OFFICE VISIT (OUTPATIENT)
Dept: FAMILY MEDICINE | Facility: OTHER | Age: 83
End: 2018-08-01
Payer: COMMERCIAL

## 2018-08-01 ENCOUNTER — TELEPHONE (OUTPATIENT)
Dept: FAMILY MEDICINE | Facility: OTHER | Age: 83
End: 2018-08-01

## 2018-08-01 VITALS
RESPIRATION RATE: 14 BRPM | HEIGHT: 66 IN | BODY MASS INDEX: 22.74 KG/M2 | DIASTOLIC BLOOD PRESSURE: 66 MMHG | SYSTOLIC BLOOD PRESSURE: 134 MMHG | WEIGHT: 141.5 LBS | TEMPERATURE: 98.5 F | HEART RATE: 80 BPM

## 2018-08-01 DIAGNOSIS — I48.20 CHRONIC ATRIAL FIBRILLATION (H): ICD-10-CM

## 2018-08-01 DIAGNOSIS — L23.7 CONTACT DERMATITIS DUE TO POISON IVY: Primary | ICD-10-CM

## 2018-08-01 DIAGNOSIS — L03.114 CELLULITIS OF LEFT UPPER EXTREMITY: ICD-10-CM

## 2018-08-01 PROCEDURE — 99203 OFFICE O/P NEW LOW 30 MIN: CPT | Performed by: PHYSICIAN ASSISTANT

## 2018-08-01 RX ORDER — METHYLPREDNISOLONE 4 MG
TABLET, DOSE PACK ORAL
Qty: 21 TABLET | Refills: 0 | Status: SHIPPED | OUTPATIENT
Start: 2018-08-01 | End: 2019-11-24

## 2018-08-01 RX ORDER — CEPHALEXIN 500 MG/1
500 CAPSULE ORAL 3 TIMES DAILY
Qty: 21 CAPSULE | Refills: 0 | Status: SHIPPED | OUTPATIENT
Start: 2018-08-01 | End: 2019-11-24

## 2018-08-01 NOTE — PATIENT INSTRUCTIONS
Please contact the provider who manages your INR level and let them know you are on these medications so that they have you recheck your levels to ensure you are within your range.  Acacia Parada PA-C

## 2018-08-01 NOTE — PROGRESS NOTES
"  SUBJECTIVE:   Estela Cabrera is a 83 year old female who presents to clinic today for the following health issues:      HPI  Rash  Onset: 2 days ago    Description:   Location: ear, hand, face  Character: round, burning, red  Itching (Pruritis): YES, patient was up all night itching    Progression of Symptoms:  same    Accompanying Signs & Symptoms:  Fever: no   Body aches or joint pain: no   Sore throat symptoms: no   Recent cold symptoms: no     History:   Previous similar rash: no     Precipitating factors:   Exposure to similar rash: no   New exposures: Yard - was working in yard and fell, she did not otherwise get injured, she could not get up immediately and scooted herself up the hill which apparently had poison ivy on it.  [place]  Her  had gotten poison ivy several years ago in same area of their property.  Recent travel: no     Alleviating factors:  None    Therapies Tried and outcome: None        Problem list and histories reviewed & adjusted, as indicated.  Additional history: she has a fib and is on warfarin.  Her PCP is in [place] and he handles her coumadin.  She is terrified she will have a stroke though her cardiologists reassure her.           BP Readings from Last 3 Encounters:   08/01/18 134/66   05/19/17 (!) 147/96   12/17/15 152/78    Wt Readings from Last 3 Encounters:   08/01/18 141 lb 8 oz (64.2 kg)   05/19/17 148 lb (67.1 kg)   12/17/15 163 lb 4 oz (74 kg)                  Labs reviewed in EPIC    ROS:  CONSTITUTIONAL: NEGATIVE for fever, chills, change in weight  INTEGUMENTARY/SKIN: as above  ENT/MOUTH: NEGATIVE for ear, mouth and throat problems  RESP: NEGATIVE for significant cough or SOB  CV: NEGATIVE for chest pain, palpitations or peripheral edema    OBJECTIVE:     /66  Pulse 80  Temp 98.5  F (36.9  C) (Temporal)  Resp 14  Ht 5' 5.67\" (1.668 m)  Wt 141 lb 8 oz (64.2 kg)  BMI 23.07 kg/m2  Body mass index is 23.07 kg/(m^2).  GENERAL: healthy, alert " and no distress  NECK: no adenopathy, no asymmetry, masses, or scars and thyroid normal to palpation  RESP: lungs clear to auscultation - no rales, rhonchi or wheezes  CV: regular rate and rhythm, normal S1 S2, no S3 or S4, no murmur, click or rub, no peripheral edema and peripheral pulses strong  MS: no gross musculoskeletal defects noted, no edema  SKIN: right ear lobe is edematous and erythematous, forehead, chin and cheek with erythematous excoriated lesion, left forearm and hand are most affected, dorsal aspect of hand is very excoriated with expanding erythema in the surrounding area    Diagnostic Test Results:  none     ASSESSMENT/PLAN:         1. Contact dermatitis due to poison ivy  Called pharmacy re her coumadin,   - methylPREDNISolone (MEDROL DOSEPAK) 4 MG tablet; Follow package instructions  Dispense: 21 tablet; Refill: 0    2. Cellulitis of left upper extremity  Pt will call her MD who manages her coumadin to report her med changes so that her INR is properly monitored   - cephALEXin (KEFLEX) 500 MG capsule; Take 1 capsule (500 mg) by mouth 3 times daily  Dispense: 21 capsule; Refill: 0    3. Chronic atrial fibrillation (H)  Sees out side provider       Follow up if worsening or failure of improvement    Acacia Parada PA-C  [facility]  Electronically signed by Acacia Parada PA-C

## 2018-08-01 NOTE — TELEPHONE ENCOUNTER
Reason for Call:  Same Day Appointment, Requested Provider:  any provider    PCP: Navin Landa    Reason for visit: poison oak or ivy    Duration of symptoms: 3 days    Have you been treated for this in the past? No    Additional comments: Patient has it very bad would like to get worked in this morning does have appt at 1:20 but she wants this morning if she can.      Can we leave a detailed message on this number? YES    Phone number patient can be reached at: Home number on file [phone] (home)    Best Time: any    Call taken on 8/1/2018 at 9:38 AM by Daisy Berkowitz

## 2018-08-01 NOTE — MR AVS SNAPSHOT
After Visit Summary   8/1/2018    Estela Cabrera    MRN: [id number]           Patient Information     Date Of Birth          1935        Visit Information        Provider Department      8/1/2018 2:15 PM Acacia Parada PA-C [facility]        Today's Diagnoses     Contact dermatitis due to poison ivy    -  1    Cellulitis of left upper extremity        Chronic atrial fibrillation (H)          Care Instructions    Please contact the provider who manages your INR level and let them know you are on these medications so that they have you recheck your levels to ensure you are within your range.  Acacia Parada PA-C           Follow-ups after your visit        Your next 10 appointments already scheduled     Aug 01, 2018  2:15 PM CDT   Office Visit with Acacia Parada PA-C   [facility]    [address], [place], [facility] [address] [phone]           Bring a current list of meds and any records pertaining to this visit. For Physicals, please bring immunization records and any forms needing to be filled out. Please arrive 10 minutes early to complete paperwork.              Who to contact     If you have questions or need follow up information about today's clinic visit or your schedule please contact [facility] directly at [phone].  Normal or non-critical lab and imaging results will be communicated to you by MyChart, letter or phone within 4 business days after the clinic has received the results. If you do not hear from us within 7 days, please contact the clinic through MyChart or phone. If you have a critical or abnormal lab result, we will notify you by phone as soon as possible.  Submit refill requests through [employer] or call your pharmacy and they will forward the refill request to us. Please allow 3 business days for your refill to be completed.          Additional Information About Your Visit       "  Care EveryWhere ID     This is your Care EveryWhere ID. This could be used by other organizations to access your [facility] medical records  [id number]        Your Vitals Were     Pulse Temperature Respirations Height BMI (Body Mass Index)       80 98.5  F (36.9  C) (Temporal) 14 5' 5.67\" (1.668 m) 23.07 kg/m2        Blood Pressure from Last 3 Encounters:   08/01/18 134/66   05/19/17 (!) 147/96   12/17/15 152/78    Weight from Last 3 Encounters:   08/01/18 141 lb 8 oz (64.2 kg)   05/19/17 148 lb (67.1 kg)   12/17/15 163 lb 4 oz (74 kg)              Today, you had the following     No orders found for display         Today's Medication Changes          These changes are accurate as of 8/1/18 11:39 AM.  If you have any questions, ask your nurse or doctor.               Start taking these medicines.        Dose/Directions    cephALEXin 500 MG capsule   Commonly known as:  KEFLEX   Used for:  Cellulitis of left upper extremity   Started by:  Acacia Parada PA-C        Dose:  500 mg   Take 1 capsule (500 mg) by mouth 3 times daily   Quantity:  21 capsule   Refills:  0       methylPREDNISolone 4 MG tablet   Commonly known as:  MEDROL DOSEPAK   Used for:  Contact dermatitis due to poison ivy   Started by:  Acacia Parada PA-C        Follow package instructions   Quantity:  21 tablet   Refills:  0            Where to get your medicines      These medications were sent to [facility] - [address]   [address] [place] [address]     Phone:  [phone]     cephALEXin 500 MG capsule    methylPREDNISolone 4 MG tablet                Primary Care Provider Office Phone # Fax #    Navin Landa [phone]       MARLEEN PICHARDO MD [facility] [address] [address]        Equal Access to Services     CRISTINA CRUZ AH: Hadii aad ku hadasho Soomaali, waaxda luqadaha, qaybta kaalmada adeegyada, waxay alfred thompson. So [facility] " [phone].    ATENCIÓN: Si stiven hackett, tiene a rubio disposición servicios gratuitos de asistencia lingüística. Rosy valenzuela [phone].    We comply with applicable federal civil rights laws and [place] laws. We do not discriminate on the basis of race, color, national origin, age, disability, sex, sexual orientation, or gender identity.            Thank you!     Thank you for choosing [facility]  for your care. Our goal is always to provide you with excellent care. Hearing back from our patients is one way we can continue to improve our services. Please take a few minutes to complete the written survey that you may receive in the mail after your visit with us. Thank you!             Your Updated Medication List - Protect others around you: Learn how to safely use, store and throw away your medicines at www.disposemymeds.org.          This list is accurate as of 8/1/18 11:39 AM.  Always use your most recent med list.                   Brand Name Dispense Instructions for use Diagnosis    ascorbic acid 1000 MG Tabs    vitamin C     Take 1 tablet by mouth        Butalbital-Acetaminophen  MG Tabs per tablet    PHRENILIN     Take 2 tablets by mouth every 4 hours as needed.        cephALEXin 500 MG capsule    KEFLEX    21 capsule    Take 1 capsule (500 mg) by mouth 3 times daily    Cellulitis of left upper extremity       cetirizine 10 MG tablet    zyrTEC    30 tablet    Take 1 tablet (10 mg) by mouth every evening        EQL NATURAL ZINC 50 MG Tabs      Take 1 tablet by mouth        GNP VITAMIN D-400  MG-UNIT Tabs   Generic drug:  Calcium Carb-Cholecalciferol      Take 1 tablet by mouth        guaiFENesin-codeine 100-10 MG/5ML Soln solution    ROBITUSSIN AC    420 mL    Take 5-10 mLs by mouth every 4 hours as needed for cough        levothyroxine 150 MCG tablet    SYNTHROID/LEVOTHROID     Take 150 mcg by mouth        meclizine 12.5 MG tablet    ANTIVERT     Take one tablet by mouth as  needed for dizziness        METFORMIN HCL PO           methylPREDNISolone 4 MG tablet    MEDROL DOSEPAK    21 tablet    Follow package instructions    Contact dermatitis due to poison ivy       MULTI-VITAMINS Tabs      Take 1 tablet by mouth        NORVASC 2.5 MG tablet   Generic drug:  amLODIPine      Take 1 tablet (2.5mg) twice a day        replens Gel     45 g    Place  Vaginally twice weekly    Vaginitis, Menopausal vaginal dryness       triamterene-hydrochlorothiazide 37.5-25 MG per capsule    DYAZIDE     Take 1 capsule by mouth        vitamin E 1000 UNIT capsule    TOCOPHEROL     Take 1 tablet by mouth        warfarin 5 MG tablet    COUMADIN     Take one and one half tablets (total dose of 7.5 mg) for three days then one tablet (5 mg) for one day. Repeat in four day cycles.

## 2018-08-01 NOTE — TELEPHONE ENCOUNTER
Spoke with patient informed her that we can work her in earlier, patient denied appointment states she is going to go to [facility] and would like her 1:20 appointment cancelled with Kari Cruz  Closing encounter  Munira Kelley RT (R)

## 2019-11-24 ENCOUNTER — HOSPITAL ENCOUNTER (INPATIENT)
Facility: CLINIC | Age: 84
LOS: 2 days | Discharge: HOME | DRG: 184 | End: 2019-11-28
Attending: EMERGENCY MEDICINE | Admitting: FAMILY MEDICINE
Payer: COMMERCIAL

## 2019-11-24 ENCOUNTER — APPOINTMENT (OUTPATIENT)
Dept: CT IMAGING | Facility: CLINIC | Age: 84
DRG: 184 | End: 2019-11-24
Attending: EMERGENCY MEDICINE
Payer: COMMERCIAL
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
## 2019-11-24 DIAGNOSIS — S22.079A CLOSED FRACTURE OF TENTH THORACIC VERTEBRA, UNSPECIFIED FRACTURE MORPHOLOGY, INITIAL ENCOUNTER (H): ICD-10-CM

## 2019-11-24 DIAGNOSIS — Z79.01 LONG TERM (CURRENT) USE OF ANTICOAGULANTS: ICD-10-CM
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
## 2019-11-24 DIAGNOSIS — I10 HTN, GOAL BELOW 140/90: Primary | ICD-10-CM

## 2019-11-24 DIAGNOSIS — S22.069A CLOSED FRACTURE OF EIGHTH THORACIC VERTEBRA, UNSPECIFIED FRACTURE MORPHOLOGY, INITIAL ENCOUNTER (H): ICD-10-CM

## 2019-11-24 DIAGNOSIS — S22.009A CLOSED FRACTURE OF TRANSVERSE PROCESS OF THORACIC VERTEBRA, INITIAL ENCOUNTER (H): ICD-10-CM

## 2019-11-24 DIAGNOSIS — S22.42XA CLOSED FRACTURE OF MULTIPLE RIBS OF LEFT SIDE, INITIAL ENCOUNTER: ICD-10-CM

## 2019-11-24 DIAGNOSIS — S22.009A FRACTURE OF THORACIC TRANSVERSE PROCESS, CLOSED, INITIAL ENCOUNTER (H): ICD-10-CM

## 2019-11-24 PROBLEM — S22.49XA RIB FRACTURES: Status: ACTIVE | Noted: 2019-11-24

## 2019-11-24 PROBLEM — S22.49XA CLOSED FRACTURE OF MULTIPLE RIBS: Status: ACTIVE | Noted: 2019-11-24

## 2019-11-24 LAB
ALBUMIN UR-MCNC: NEGATIVE MG/DL
ANION GAP SERPL CALCULATED.3IONS-SCNC: 8 MMOL/L (ref 3–14)
APPEARANCE UR: CLEAR
BASOPHILS # BLD AUTO: 0.1 10E9/L (ref 0–0.2)
BASOPHILS NFR BLD AUTO: 0.6 %
BILIRUB UR QL STRIP: NEGATIVE
BUN SERPL-MCNC: 19 MG/DL (ref 7–30)
CALCIUM SERPL-MCNC: 9.4 MG/DL (ref 8.5–10.1)
CHLORIDE SERPL-SCNC: 103 MMOL/L (ref 94–109)
CO2 SERPL-SCNC: 31 MMOL/L (ref 20–32)
COLOR UR AUTO: ABNORMAL
CREAT SERPL-MCNC: 0.94 MG/DL (ref 0.52–1.04)
DIFFERENTIAL METHOD BLD: ABNORMAL
EOSINOPHIL NFR BLD AUTO: 2.3 %
ERYTHROCYTE [DISTWIDTH] IN BLOOD BY AUTOMATED COUNT: 12.6 % (ref 10–15)
GFR SERPL CREATININE-BSD FRML MDRD: 55 ML/MIN/{1.73_M2}
GLUCOSE SERPL-MCNC: 86 MG/DL (ref 70–99)
GLUCOSE UR STRIP-MCNC: NEGATIVE MG/DL
HCT VFR BLD AUTO: 35.5 % (ref 35–47)
HGB BLD-MCNC: 11.6 G/DL (ref 11.7–15.7)
HGB UR QL STRIP: NEGATIVE
IMM GRANULOCYTES # BLD: 0.1 10E9/L (ref 0–0.4)
IMM GRANULOCYTES NFR BLD: 0.5 %
INR PPP: 3.3 (ref 0.86–1.14)
KETONES UR STRIP-MCNC: NEGATIVE MG/DL
LEUKOCYTE ESTERASE UR QL STRIP: ABNORMAL
LYMPHOCYTES # BLD AUTO: 1.6 10E9/L (ref 0.8–5.3)
LYMPHOCYTES NFR BLD AUTO: 13.5 %
MCH RBC QN AUTO: 32.1 PG (ref 26.5–33)
MCHC RBC AUTO-ENTMCNC: 32.7 G/DL (ref 31.5–36.5)
MCV RBC AUTO: 98 FL (ref 78–100)
MONOCYTES # BLD AUTO: 1 10E9/L (ref 0–1.3)
MONOCYTES NFR BLD AUTO: 8.2 %
NEUTROPHILS # BLD AUTO: 8.6 10E9/L (ref 1.6–8.3)
NEUTROPHILS NFR BLD AUTO: 74.9 %
NITRATE UR QL: NEGATIVE
NRBC # BLD AUTO: 0 10*3/UL
NRBC BLD AUTO-RTO: 0 /100
PH UR STRIP: 7 PH (ref 5–7)
PLATELET # BLD AUTO: 296 10E9/L (ref 150–450)
POTASSIUM SERPL-SCNC: 3 MMOL/L (ref 3.4–5.3)
RBC # BLD AUTO: 3.61 10E12/L (ref 3.8–5.2)
RBC #/AREA URNS AUTO: 1 /HPF (ref 0–2)
SODIUM SERPL-SCNC: 142 MMOL/L (ref 133–144)
SOURCE: ABNORMAL
SP GR UR STRIP: 1.01 (ref 1–1.03)
SQUAMOUS #/AREA URNS AUTO: 1 /HPF (ref 0–1)
UROBILINOGEN UR STRIP-MCNC: 0 MG/DL (ref 0–2)
WBC # BLD AUTO: 11.6 10E9/L (ref 4–11)
WBC #/AREA URNS AUTO: 3 /HPF (ref 0–5)

## 2019-11-24 PROCEDURE — 25000132 ZZH RX MED GY IP 250 OP 250 PS 637: Performed by: EMERGENCY MEDICINE

## 2019-11-24 PROCEDURE — 25000125 ZZHC RX 250: Performed by: EMERGENCY MEDICINE

## 2019-11-24 PROCEDURE — 81001 URINALYSIS AUTO W/SCOPE: CPT | Performed by: EMERGENCY MEDICINE

## 2019-11-24 PROCEDURE — 25000128 H RX IP 250 OP 636: Performed by: NURSE PRACTITIONER

## 2019-11-24 PROCEDURE — 25000128 H RX IP 250 OP 636: Performed by: EMERGENCY MEDICINE

## 2019-11-24 PROCEDURE — 80048 BASIC METABOLIC PNL TOTAL CA: CPT | Performed by: EMERGENCY MEDICINE

## 2019-11-24 PROCEDURE — 85610 PROTHROMBIN TIME: CPT | Performed by: EMERGENCY MEDICINE

## 2019-11-24 PROCEDURE — 96375 TX/PRO/DX INJ NEW DRUG ADDON: CPT

## 2019-11-24 PROCEDURE — 96374 THER/PROPH/DIAG INJ IV PUSH: CPT | Performed by: EMERGENCY MEDICINE

## 2019-11-24 PROCEDURE — 99220 ZZC INITIAL OBSERVATION CARE,LEVL III: CPT | Performed by: NURSE PRACTITIONER

## 2019-11-24 PROCEDURE — 99285 EMERGENCY DEPT VISIT HI MDM: CPT | Mod: 25 | Performed by: EMERGENCY MEDICINE

## 2019-11-24 PROCEDURE — 25000132 ZZH RX MED GY IP 250 OP 250 PS 637: Performed by: FAMILY MEDICINE

## 2019-11-24 PROCEDURE — G0378 HOSPITAL OBSERVATION PER HR: HCPCS

## 2019-11-24 PROCEDURE — 25000132 ZZH RX MED GY IP 250 OP 250 PS 637: Performed by: NURSE PRACTITIONER

## 2019-11-24 PROCEDURE — 71260 CT THORAX DX C+: CPT

## 2019-11-24 PROCEDURE — 99284 EMERGENCY DEPT VISIT MOD MDM: CPT | Mod: 25 | Performed by: EMERGENCY MEDICINE

## 2019-11-24 PROCEDURE — 85025 COMPLETE CBC W/AUTO DIFF WBC: CPT | Performed by: EMERGENCY MEDICINE

## 2019-11-24 RX ORDER — POTASSIUM CHLORIDE 1.5 G/1.58G
20-40 POWDER, FOR SOLUTION ORAL
Status: DISCONTINUED | OUTPATIENT
Start: 2019-11-24 | End: 2019-11-28 | Stop reason: HOSPADM

## 2019-11-24 RX ORDER — HYDROMORPHONE HYDROCHLORIDE 1 MG/ML
0.3 INJECTION, SOLUTION INTRAMUSCULAR; INTRAVENOUS; SUBCUTANEOUS
Status: COMPLETED | OUTPATIENT
Start: 2019-11-24 | End: 2019-11-24

## 2019-11-24 RX ORDER — ACETAMINOPHEN 325 MG/1
650 TABLET ORAL EVERY 4 HOURS PRN
Status: DISCONTINUED | OUTPATIENT
Start: 2019-11-24 | End: 2019-11-25

## 2019-11-24 RX ORDER — IOPAMIDOL 755 MG/ML
500 INJECTION, SOLUTION INTRAVASCULAR ONCE
Status: COMPLETED | OUTPATIENT
Start: 2019-11-24 | End: 2019-11-24

## 2019-11-24 RX ORDER — POTASSIUM CHLORIDE 29.8 MG/ML
20 INJECTION INTRAVENOUS
Status: DISCONTINUED | OUTPATIENT
Start: 2019-11-24 | End: 2019-11-24

## 2019-11-24 RX ORDER — AMLODIPINE BESYLATE 2.5 MG/1
2.5 TABLET ORAL 2 TIMES DAILY
Status: DISCONTINUED | OUTPATIENT
Start: 2019-11-24 | End: 2019-11-25

## 2019-11-24 RX ORDER — HYDROCODONE BITARTRATE AND ACETAMINOPHEN 5; 325 MG/1; MG/1
1-2 TABLET ORAL EVERY 4 HOURS PRN
Status: DISCONTINUED | OUTPATIENT
Start: 2019-11-24 | End: 2019-11-28 | Stop reason: HOSPADM

## 2019-11-24 RX ORDER — POTASSIUM CHLORIDE 7.45 MG/ML
10 INJECTION INTRAVENOUS
Status: DISCONTINUED | OUTPATIENT
Start: 2019-11-24 | End: 2019-11-28 | Stop reason: HOSPADM

## 2019-11-24 RX ORDER — COVID-19 ANTIGEN TEST
220 KIT MISCELLANEOUS 2 TIMES DAILY WITH MEALS
COMMUNITY

## 2019-11-24 RX ORDER — ONDANSETRON 2 MG/ML
4 INJECTION INTRAMUSCULAR; INTRAVENOUS EVERY 6 HOURS PRN
Status: DISCONTINUED | OUTPATIENT
Start: 2019-11-24 | End: 2019-11-28 | Stop reason: HOSPADM

## 2019-11-24 RX ORDER — NALOXONE HYDROCHLORIDE 0.4 MG/ML
.1-.4 INJECTION, SOLUTION INTRAMUSCULAR; INTRAVENOUS; SUBCUTANEOUS
Status: DISCONTINUED | OUTPATIENT
Start: 2019-11-24 | End: 2019-11-28 | Stop reason: HOSPADM

## 2019-11-24 RX ORDER — POTASSIUM CHLORIDE 1500 MG/1
20-40 TABLET, EXTENDED RELEASE ORAL
Status: DISCONTINUED | OUTPATIENT
Start: 2019-11-24 | End: 2019-11-28 | Stop reason: HOSPADM

## 2019-11-24 RX ORDER — AMOXICILLIN 250 MG
1 CAPSULE ORAL 2 TIMES DAILY
Status: DISCONTINUED | OUTPATIENT
Start: 2019-11-24 | End: 2019-11-28 | Stop reason: HOSPADM

## 2019-11-24 RX ORDER — AMOXICILLIN 250 MG
2 CAPSULE ORAL 2 TIMES DAILY
Status: DISCONTINUED | OUTPATIENT
Start: 2019-11-24 | End: 2019-11-28 | Stop reason: HOSPADM

## 2019-11-24 RX ORDER — LIDOCAINE 4 G/G
2 PATCH TOPICAL ONCE
Status: COMPLETED | OUTPATIENT
Start: 2019-11-24 | End: 2019-11-25

## 2019-11-24 RX ORDER — ACETAMINOPHEN 650 MG/1
650 SUPPOSITORY RECTAL EVERY 4 HOURS PRN
Status: DISCONTINUED | OUTPATIENT
Start: 2019-11-24 | End: 2019-11-28 | Stop reason: HOSPADM

## 2019-11-24 RX ORDER — ONDANSETRON 4 MG/1
4 TABLET, ORALLY DISINTEGRATING ORAL EVERY 6 HOURS PRN
Status: DISCONTINUED | OUTPATIENT
Start: 2019-11-24 | End: 2019-11-28 | Stop reason: HOSPADM

## 2019-11-24 RX ORDER — HYDROMORPHONE HYDROCHLORIDE 1 MG/ML
0.3 INJECTION, SOLUTION INTRAMUSCULAR; INTRAVENOUS; SUBCUTANEOUS
Status: DISCONTINUED | OUTPATIENT
Start: 2019-11-24 | End: 2019-11-28 | Stop reason: HOSPADM

## 2019-11-24 RX ORDER — KETOROLAC TROMETHAMINE 15 MG/ML
15 INJECTION, SOLUTION INTRAMUSCULAR; INTRAVENOUS EVERY 6 HOURS PRN
Status: DISCONTINUED | OUTPATIENT
Start: 2019-11-24 | End: 2019-11-26

## 2019-11-24 RX ORDER — POLYETHYLENE GLYCOL 3350 17 G/17G
17 POWDER, FOR SOLUTION ORAL DAILY PRN
Status: DISCONTINUED | OUTPATIENT
Start: 2019-11-24 | End: 2019-11-25

## 2019-11-24 RX ORDER — IBUPROFEN 200 MG
400 TABLET ORAL EVERY 4 HOURS PRN
Status: ON HOLD | COMMUNITY
End: 2019-11-28

## 2019-11-24 RX ORDER — POTASSIUM CL/LIDO/0.9 % NACL 10MEQ/0.1L
10 INTRAVENOUS SOLUTION, PIGGYBACK (ML) INTRAVENOUS
Status: DISCONTINUED | OUTPATIENT
Start: 2019-11-24 | End: 2019-11-28 | Stop reason: HOSPADM

## 2019-11-24 RX ADMIN — IOPAMIDOL 75 ML: 755 INJECTION, SOLUTION INTRAVENOUS at 15:08

## 2019-11-24 RX ADMIN — SENNOSIDES AND DOCUSATE SODIUM 1 TABLET: 8.6; 5 TABLET ORAL at 20:01

## 2019-11-24 RX ADMIN — POTASSIUM CHLORIDE 40 MEQ: 1500 TABLET, EXTENDED RELEASE ORAL at 23:23

## 2019-11-24 RX ADMIN — ACETAMINOPHEN 650 MG: 325 TABLET, FILM COATED ORAL at 21:55

## 2019-11-24 RX ADMIN — AMLODIPINE BESYLATE 2.5 MG: 2.5 TABLET ORAL at 20:01

## 2019-11-24 RX ADMIN — KETOROLAC TROMETHAMINE 15 MG: 15 INJECTION, SOLUTION INTRAMUSCULAR; INTRAVENOUS at 21:55

## 2019-11-24 RX ADMIN — LIDOCAINE 2 PATCH: 560 PATCH PERCUTANEOUS; TOPICAL; TRANSDERMAL at 17:09

## 2019-11-24 RX ADMIN — HYDROMORPHONE HYDROCHLORIDE 0.3 MG: 1 INJECTION, SOLUTION INTRAMUSCULAR; INTRAVENOUS; SUBCUTANEOUS at 14:49

## 2019-11-24 RX ADMIN — SODIUM CHLORIDE 75 ML: 9 INJECTION, SOLUTION INTRAVENOUS at 15:07

## 2019-11-24 ASSESSMENT — MIFFLIN-ST. JEOR: SCORE: 1130.37

## 2019-11-24 NOTE — ED TRIAGE NOTES
PT was trying on a shoe.  She kicked up her leg to shake off the shoe and she fell against a cedar chest and injured left upper back.  Denies any previous back problems.

## 2019-11-24 NOTE — H&P
[facility]    History and Physical - Hospitalist Service       Date of Admission:  11/24/2019    Assessment & Plan   Estela Cabrera is a 84 year old female admitted on 11/24/2019. She presented to the emergency department complaining of back pain after a fall.  Patient was trying on her shoes and trying to get one off when he she lost her balance and fell backwards.  Patient's back hit a cedar chest on the left side.  Patient had immediate pain that was worse with movement and breathing.  Pain does not radiate into the abdomen, neck or chest.  In the emergency department a CT showed multiple rib fractures on the left side of T7-10 with fractures of the transverse process in the T8 and 9.  Patient also on Coumadin for atrial fibrillation with an INR of 3.3.  There is a moderate amount of swelling on the left side of her back. Will admit to observation for pain management and physical therapy evaluation.       Closed fracture of transverse process of thoracic vertebra (H) - T8-T9    Closed fracture of multiple ribs of left side - T7-10  Assessment: Patient presented after a fall to the emergency department as above.  Patient with noted fractures on her ribs and transverse process of the T8 and 9.  Patient will be admitted to observation status for pain control and consult to surgery team for trauma.  Plan: Plan surgical consult for trauma.  Physical therapy evaluation for home safety and ambulation.  Pain management including Lidoderm patches, Vicodin, ibuprofen and Tylenol.  Unclear if TLSO brace would be helpful, will defer to surgery.  Patient did become hypoxic after IV pain medication given in the emergency department.  Incentive spirometry.      HTN, goal below 140/90  Assessment: Chronic and stable with current blood pressure high normal. Patient normally takes Norvasc 2.5 mg twice a day and Dyazide 37-25 daily..  Plan: We will monitor blood pressure closely and continue home  medications.  Elevation likely due to pain.      Long term current use of anticoagulant therapy    Chronic atrial fibrillation  Assessment: Patient with chronic atrial fibrillation currently on Coumadin with INR of 3.3.  Plan: We will continue Coumadin with recheck of INR tomorrow.  Will need to monitor patient's site of fall with rib fractures and possible hematoma.      Fall  Assessment: Patient presented after fall as above at home.  Patient tripped while trying to take off her shoe and fell backwards into a cedar chest.  Plan: Plan as above.  Surgical consult.  Physical therapy evaluation.       Diet: Regular  DVT Prophylaxis: Ambulate every shift  Hernandez Catheter: not present  Code Status: Full code    Disposition Plan   Expected discharge: Tomorrow, recommended to prior living arrangement once adequate pain management/ tolerating PO medications and safe disposition plan/ TCU bed available.  Entered: Olga Aguilar CNP 11/24/2019, 5:44 PM     The patient's care was discussed with the Attending Physician, Dr. Zurita, Bedside Nurse, Patient and Patient's Family.    Olga Aguilar CNP  [facility] [place]    ______________________________________________________________________    Chief Complaint   Fall, back pain    History is obtained from the patient, electronic health record, emergency department physician and patient's spouse    History of Present Illness   Estela Cabrera is a 84 year old female who presents to the emergency department with back pain. The patient was trying on shoes earlier today and was kicking one of the shoes off when she lost her balance. The patient hit her back on a cedar chest, hitting two ridges on the chest as she fell. She has moderate pain on the left side of her back.  Pain does not radiate to her chest.  The pain does not radiate into the abdomen. She states that she has slight pain while breathing and she was nauseated after the injury. She  denies injuring her head or neck. She denies any loss of consciousness.  No hematuria.  No radicular leg pain or weakness.  No saddle paresthesias.  Patient did take some Aleve at home with some improvement.  She is no longer nauseated.  Denies cough.  Pain is worse with movement or inspiration.     Review of Systems    The 10 point Review of Systems is negative other than noted in the HPI or here.     Past Medical History    I have reviewed this patient's medical history and updated it with pertinent information if needed.   Past Medical History:   Diagnosis Date     Atrial fibrillation (H)      Diverticulitis      High blood pressure      Long term current use of anticoagulant therapy 11/24/2019     Thyroid disease      Vertigo        Past Surgical History   I have reviewed this patient's surgical history and updated it with pertinent information if needed.  Past Surgical History:   Procedure Laterality Date     ankle surgeries       HYSTERECTOMY       TONSILLECTOMY         Social History   I have reviewed this patient's social history and updated it with pertinent information if needed.  Social History     Tobacco Use     Smoking status: Never Smoker     Smokeless tobacco: Never Used   Substance Use Topics     Alcohol use: No     Drug use: No       Family History   I have reviewed this patient's family history and updated it with pertinent information if needed.   Family History   Problem Relation Age of Onset     Hypertension Mother      Thyroid Disease Mother      Macular Degeneration Mother      Hypertension Sister      Thyroid Disease Sister      Hypertension Daughter      Thyroid Disease Daughter      Glaucoma No family hx of        Prior to Admission Medications   Prior to Admission Medications   Prescriptions Last Dose Informant Patient Reported? Taking?   Calcium Carb-Cholecalciferol (GNP VITAMIN D-400)  MG-UNIT TABS 11/23/2019 at Unknown time  Yes Yes   Sig: Take 1 tablet by mouth   METFORMIN HCL  PO 2019 at 0900  Yes Yes   Sig: Take 500 mg by mouth daily (with breakfast)    Multiple Vitamin (MULTI-VITAMINS) TABS 2019 at 0900  Yes Yes   Sig: Take 1 tablet by mouth   Vaginal Lubricant (REPLENS) GEL Unknown at Unknown time  No No   Sig: Place  Vaginally twice weekly   amLODIPine (NORVASC) 2.5 MG tablet 2019 at 0900  Yes Yes   Sig: Take 1 tablet (2.5mg) twice a day   ascorbic acid (VITAMIN C) 1000 MG TABS 2019 at Unknown time  Yes Yes   Sig: Take 1 tablet by mouth   ibuprofen (ADVIL/MOTRIN) 200 MG tablet 2019 at PTA  Yes Yes   Sig: Take 400 mg by mouth every 4 hours as needed for mild pain   levothyroxine (SYNTHROID, LEVOTHROID) 150 MCG tablet 2019 at 0900  Yes Yes   Sig: Take 150 mcg by mouth   meclizine (ANTIVERT) 12.5 MG tablet 2019 at 2 doses today  Yes Yes   Sig: Take one tablet by mouth as needed for dizziness   naproxen sodium 220 MG capsule 2019 at 2 tabs PTA  Yes Yes   Sig: Take 220 mg by mouth 2 times daily (with meals)   triamterene-hydrochlorothiazide (DYAZIDE) 37.5-25 MG per capsule 2019 at 0900  Yes Yes   Sig: Take 1 capsule by mouth   warfarin (COUMADIN) 5 MG tablet 2019 at 0900 10mg  Yes Yes   Si mg 7.5 mg every day except  takes 10 mg      Facility-Administered Medications: None     Allergies   No Known Allergies    Physical Exam   Vital Signs: Temp: 97.1  F (36.2  C) Temp src: Oral BP: (!) 182/110 Pulse: 63 Heart Rate: 73 Resp: 16 SpO2: 98 % O2 Device: None (Room air)    Weight: 145 lbs 0 oz    Constitutional: awake, alert, cooperative, mild distress due to pain   Eyes: Lids and lashes normal, pupils equal, round and reactive to light, extra ocular muscles intact, sclera clear, conjunctiva normal  ENT: Normocephalic, without obvious abnormality, atraumatic, sinuses nontender on palpation, external ears without lesions, oral pharynx with moist mucous membranes, tonsils without erythema or exudates, gums normal and good  dentition.  Respiratory: No increased work of breathing, good air exchange, clear to auscultation bilaterally, no crackles or wheezing  Cardiovascular: Normal apical impulse, iregular rate and rhythm, atrial fibrillation  GI: Normal bowel sounds, soft, non-distended, non-tender. Patient states she has had blood per rectum chronically.    Skin: scattered bruising, no bleeding and normal skin color, texture, turgor  Musculoskeletal: There is no redness, warmth, or swelling of the joints.  Full range of motion noted.   Neurologic: Awake, alert, oriented to name, place and time.  Cranial nerves II-XII are grossly intact.     Data   Data reviewed today: I reviewed all medications, new labs and imaging results over the last 24 hours.     Recent Labs   Lab 11/24/19  1406   WBC 11.6*   HGB 11.6*   MCV 98      INR 3.30*      POTASSIUM 3.0*   CHLORIDE 103   CO2 31   BUN 19   CR 0.94   ANIONGAP 8   JOSE CARLOS 9.4   GLC 86     Recent Results (from the past 24 hour(s))   CT Chest w Contrast    Narrative    CT CHEST WITH CONTRAST  11/24/2019 3:29 PM    HISTORY:  Fall with posterior contusion over the inferior aspect of  the left scapula and over the left lower costal margin/CVA area.    TECHNIQUE: Scans obtained from the apices through the diaphragm with  IV contrast. 75mL, Isovue 370 IV injected. Radiation dose for this  scan was reduced using automated exposure control, adjustment of the  mA and/or kV according to patient size, or iterative reconstruction  technique.    COMPARISON:  None available.    FINDINGS:  Vascular: Ectasia of the ascending thoracic aorta measuring 3.6 cm in  diameter. No cardiomegaly or pericardial effusion. Multiple prominent  mediastinal lymph nodes, for example 1.2 cm short axis right  pretracheal lymph node (series 2 image 22), could be reactive.  Moderate atherosclerotic vascular calcification of the coronary  arteries and thoracic aorta.    Lungs and pleura: No pleural effusion or  pneumothorax. No suspicious  focal pulmonary opacities.    Upper abdomen: Limited evaluation of the upper abdomen due to lack of  coverage.    Bones and soft tissue: Few mildly displaced fractures of the 7th  through the 10th left ribs along the costovertebral junctions (series  7 images 105, 127, 149 and 177) as well as the adjacent 8th and 9th  left transverse processes (series 7 images 135 and 160). Multilevel  degenerative changes of the visualized spine. Diffuse heterogenous  appearance of the thoracic vertebrae with sclerotic and lucent areas  of indeterminate etiology.      Impression    IMPRESSION:   1. Mildly displaced fractures of the 7th through the 10th left ribs  along the costovertebral junctions as well as the adjacent transverse  processes of T8 and T9.  2. Diffuse heterogenous appearance of the thoracic vertebrae of  indeterminate etiology, can be further evaluated with abdominal MRI if  clinically warranted.      LEONIE HIGGINBOTHAM MD

## 2019-11-24 NOTE — ED PROVIDER NOTES
History     Chief Complaint   Patient presents with     Back Pain     The history is provided by the patient.     Estela Cabrera is a 84 year old female who presents to the emergency department with back pain. The patient was trying on shoes earlier today and was kicking one of the shoes off when she lost her balance. The patient hit her back on a cedar chest, hitting two ridges on the chest as she fell. She has moderate pain on the left side of her back.  Pain does not radiate to her chest.  The pain does not radiate into the abdomen. She states that she has slight pain while breathing and she was nauseated after the injury. She denies injuring her head or neck. She denies any loss of consciousness.  No hematuria.  No radicular leg pain or weakness.  No saddle paresthesias.  Patient did take some Aleve at home with some improvement.  She is no longer nauseated.  Denies cough.  Pain is worse with movement or inspiration.    Allergies:  No Known Allergies    Problem List:    Patient Active Problem List    Diagnosis Date Noted     Chronic atrial fibrillation 08/01/2018     Priority: Medium     Migraine headache 02/15/2013     Priority: Medium     since age 20       Gastritis 02/15/2013     Priority: Medium     HTN, goal below 140/90 02/15/2013     Priority: Medium     CARDIOVASCULAR SCREENING; LDL GOAL LESS THAN 130 02/15/2013     Priority: Medium        Past Medical History:    Past Medical History:   Diagnosis Date     Atrial fibrillation (H)      Diverticulitis      High blood pressure      Thyroid disease      Vertigo        Past Surgical History:    Past Surgical History:   Procedure Laterality Date     ankle surgeries       HYSTERECTOMY       TONSILLECTOMY         Family History:    Family History   Problem Relation Age of Onset     Hypertension Mother      Thyroid Disease Mother      Macular Degeneration Mother      Hypertension Sister      Thyroid Disease Sister      Hypertension Daughter      Thyroid  Disease Daughter      Glaucoma No family hx of        Social History:  Marital Status:   [4]  Social History     Tobacco Use     Smoking status: Never Smoker     Smokeless tobacco: Never Used   Substance Use Topics     Alcohol use: No     Drug use: No        Medications:    amLODIPine (NORVASC) 2.5 MG tablet  ascorbic acid (VITAMIN C) 1000 MG TABS  Calcium Carb-Cholecalciferol (GNP VITAMIN D-400)  MG-UNIT TABS  ibuprofen (ADVIL/MOTRIN) 200 MG tablet  levothyroxine (SYNTHROID, LEVOTHROID) 150 MCG tablet  meclizine (ANTIVERT) 12.5 MG tablet  METFORMIN HCL PO  Multiple Vitamin (MULTI-VITAMINS) TABS  naproxen sodium 220 MG capsule  triamterene-hydrochlorothiazide (DYAZIDE) 37.5-25 MG per capsule  warfarin (COUMADIN) 5 MG tablet  Vaginal Lubricant (REPLENS) GEL          Review of Systems   All other systems reviewed and are negative.      Physical Exam   BP: 127/87  Pulse: 63  Heart Rate: 73  Temp: 97.1  F (36.2  C)  Resp: 16  Weight: 65.8 kg (145 lb)  SpO2: 100 %      Physical Exam neuro alert cooperative and pleasant female in moderate distress.  HEENT is atraumatic.  Neck is supple and nontender.  The bony spine is nontender.  There is no crepitus or step-off.  She has bruising and tenderness over the inferior aspect of the left scapula and over the left lower costal margin and flank area.  No crepitus or step-off is felt.  Normal respiratory auscultation.  Chest and abdomen are nontender.  Extremities are atraumatic.        ED Course        Procedures               Critical Care time:  none               Results for orders placed or performed during the hospital encounter of 11/24/19 (from the past 24 hour(s))   CBC with platelets differential   Result Value Ref Range    WBC 11.6 (H) 4.0 - 11.0 10e9/L    RBC Count 3.61 (L) 3.8 - 5.2 10e12/L    Hemoglobin 11.6 (L) 11.7 - 15.7 g/dL    Hematocrit 35.5 35.0 - 47.0 %    MCV 98 78 - 100 fl    MCH 32.1 26.5 - 33.0 pg    MCHC 32.7 31.5 - 36.5 g/dL    RDW 12.6  10.0 - 15.0 %    Platelet Count 296 150 - 450 10e9/L    Diff Method Automated Method     % Neutrophils 74.9 %    % Lymphocytes 13.5 %    % Monocytes 8.2 %    % Eosinophils 2.3 %    % Basophils 0.6 %    % Immature Granulocytes 0.5 %    Nucleated RBCs 0 0 /100    Absolute Neutrophil 8.6 (H) 1.6 - 8.3 10e9/L    Absolute Lymphocytes 1.6 0.8 - 5.3 10e9/L    Absolute Monocytes 1.0 0.0 - 1.3 10e9/L    Absolute Basophils 0.1 0.0 - 0.2 10e9/L    Abs Immature Granulocytes 0.1 0 - 0.4 10e9/L    Absolute Nucleated RBC 0.0    Basic metabolic panel   Result Value Ref Range    Sodium 142 133 - 144 mmol/L    Potassium 3.0 (L) 3.4 - 5.3 mmol/L    Chloride 103 94 - 109 mmol/L    Carbon Dioxide 31 20 - 32 mmol/L    Anion Gap 8 3 - 14 mmol/L    Glucose 86 70 - 99 mg/dL    Urea Nitrogen 19 7 - 30 mg/dL    Creatinine 0.94 0.52 - 1.04 mg/dL    GFR Estimate 55 (L) >60 mL/min/[1.73_m2]    GFR Estimate If Black 64 >60 mL/min/[1.73_m2]    Calcium 9.4 8.5 - 10.1 mg/dL   INR   Result Value Ref Range    INR 3.30 (H) 0.86 - 1.14   UA reflex to Microscopic   Result Value Ref Range    Color Urine Straw     Appearance Urine Clear     Glucose Urine Negative NEG^Negative mg/dL    Bilirubin Urine Negative NEG^Negative    Ketones Urine Negative NEG^Negative mg/dL    Specific Gravity Urine 1.006 1.003 - 1.035    Blood Urine Negative NEG^Negative    pH Urine 7.0 5.0 - 7.0 pH    Protein Albumin Urine Negative NEG^Negative mg/dL    Urobilinogen mg/dL 0.0 0.0 - 2.0 mg/dL    Nitrite Urine Negative NEG^Negative    Leukocyte Esterase Urine Small (A) NEG^Negative    Source Unspecified Urine     RBC Urine 1 0 - 2 /HPF    WBC Urine 3 0 - 5 /HPF    Squamous Epithelial /HPF Urine 1 0 - 1 /HPF   CT Chest w Contrast    Narrative    CT CHEST WITH CONTRAST  11/24/2019 3:29 PM    HISTORY:  Fall with posterior contusion over the inferior aspect of  the left scapula and over the left lower costal margin/CVA area.    TECHNIQUE: Scans obtained from the apices through the  diaphragm with  IV contrast. 75mL, Isovue 370 IV injected. Radiation dose for this  scan was reduced using automated exposure control, adjustment of the  mA and/or kV according to patient size, or iterative reconstruction  technique.    COMPARISON:  None available.    FINDINGS:  Vascular: Ectasia of the ascending thoracic aorta measuring 3.6 cm in  diameter. No cardiomegaly or pericardial effusion. Multiple prominent  mediastinal lymph nodes, for example 1.2 cm short axis right  pretracheal lymph node (series 2 image 22), could be reactive.  Moderate atherosclerotic vascular calcification of the coronary  arteries and thoracic aorta.    Lungs and pleura: No pleural effusion or pneumothorax. No suspicious  focal pulmonary opacities.    Upper abdomen: Limited evaluation of the upper abdomen due to lack of  coverage.    Bones and soft tissue: Few mildly displaced fractures of the 7th  through the 10th left ribs along the costovertebral junctions (series  7 images 105, 127, 149 and 177) as well as the adjacent 8th and 9th  left transverse processes (series 7 images 135 and 160). Multilevel  degenerative changes of the visualized spine. Diffuse heterogenous  appearance of the thoracic vertebrae with sclerotic and lucent areas  of indeterminate etiology.      Impression    IMPRESSION:   1. Mildly displaced fractures of the 7th through the 10th left ribs  along the costovertebral junctions as well as the adjacent transverse  processes of T8 and T9.  2. Diffuse heterogenous appearance of the thoracic vertebrae of  indeterminate etiology, can be further evaluated with abdominal MRI if  clinically warranted.      LEONIE HIGGINBOTHAM MD       Medications   Lidocaine (LIDOCARE) 4 % Patch 2 patch (2 patches Transdermal Given 11/24/19 1074)   HYDROmorphone (PF) (DILAUDID) injection 0.3 mg (0.3 mg Intravenous Given 11/24/19 1449)   iopamidol (ISOVUE-370) solution 500 mL (75 mLs Intravenous Given 11/24/19 1508)   sodium chloride  (PF) 0.9% PF flush 3 mL (3 mLs Intravenous Given 11/24/19 1507)   sodium chloride 0.9 % bag 100mL for CT scan flush use (75 mLs Intravenous Given 11/24/19 1507)     Is established and blood work was obtained.  Urinalysis is ordered.  Pending urine results imaging will be done thereafter.  Results of her urine does not show gross hematuria or blood.  CT of the chest with IV contrast was ordered.  Patient changed her mind and now does request some for pain.  IV Dilaudid as ordered.  Assessments & Plan (with Medical Decision Making)   Estela Cabrera is a 84 year old female who presents to the emergency department with back pain. The patient was trying on shoes earlier today and was kicking one of the shoes off when she lost her balance. The patient hit her back on a cedar chest, hitting two ridges on the chest as she fell. She has moderate pain on the left side of her back.  Pain does not radiate to her chest.  The pain does not radiate into the abdomen. She states that she has slight pain while breathing and she was nauseated after the injury. She denies injuring her head or neck. She denies any loss of consciousness.  No hematuria.  No radicular leg pain or weakness.  No saddle paresthesias.  Patient did take some Aleve at home with some improvement.  She is no longer nauseated.  Denies cough.  Pain is worse with movement or inspiration.  Presentation patient was afebrile and vitally stable.  Not hypoxic.  Moderate pain with bruising and tenderness in the left scapula and lower thoracic regions.  These are noted in the photo above.  Urine initially was unremarkable so doubt acute kidney injury.  Blood work shows a stable globin.  INR is 3.3 and she is on Coumadin.  CT of the chest with contrast shows 4 rib fractures mildly displaced.  Also two transverse process fractures.  Patient remained vitally stable throughout the ER stay.  She had adequate pain control with Dilaudid.  We applied a Lidoderm patch.  I spoke to  the hospital service Dr. Zurita who will admit the patient at this time.  I also talked to Dr. Lua from surgery to follow from the rib fracture standpoint.  I have reviewed the nursing notes.    I have reviewed the findings, diagnosis, plan and need for follow up with the patient.       New Prescriptions    No medications on file       Final diagnoses:   Fall, initial encounter   Closed fracture of multiple ribs of left side, initial encounter   Fracture of thoracic transverse process, closed, initial encounter (H)   This document serves as a record of services personally performed by Rocky Aguilar MD. It was created on their behalf by Raquel Feng, a trained medical scribe. The creation of this record is based on the provider's personal observations and the statements of the patient. This document has been checked and approved by the attending provider.  Note: Chart documentation done in part with Dragon Voice Recognition software. Although reviewed after completion, some word and grammatical errors may remain.    11/24/2019   [facility] EMERGENCY DEPARTMENT     Rocky Aguilar MD  11/24/19 1767

## 2019-11-25 ENCOUNTER — APPOINTMENT (OUTPATIENT)
Dept: PHYSICAL THERAPY | Facility: CLINIC | Age: 84
DRG: 184 | End: 2019-11-25
Attending: NURSE PRACTITIONER
Payer: COMMERCIAL

## 2019-11-25 ENCOUNTER — APPOINTMENT (OUTPATIENT)
Dept: GENERAL RADIOLOGY | Facility: CLINIC | Age: 84
DRG: 184 | End: 2019-11-25
Attending: NURSE PRACTITIONER
Payer: COMMERCIAL

## 2019-11-25 LAB
ANION GAP SERPL CALCULATED.3IONS-SCNC: 4 MMOL/L (ref 3–14)
BUN SERPL-MCNC: 15 MG/DL (ref 7–30)
CALCIUM SERPL-MCNC: 8.7 MG/DL (ref 8.5–10.1)
CHLORIDE SERPL-SCNC: 107 MMOL/L (ref 94–109)
CO2 SERPL-SCNC: 32 MMOL/L (ref 20–32)
CREAT SERPL-MCNC: 0.93 MG/DL (ref 0.52–1.04)
ERYTHROCYTE [DISTWIDTH] IN BLOOD BY AUTOMATED COUNT: 12.6 % (ref 10–15)
GFR SERPL CREATININE-BSD FRML MDRD: 57 ML/MIN/{1.73_M2}
GLUCOSE SERPL-MCNC: 91 MG/DL (ref 70–99)
HCT VFR BLD AUTO: 32.3 % (ref 35–47)
HGB BLD-MCNC: 10.4 G/DL (ref 11.7–15.7)
HGB BLD-MCNC: 10.5 G/DL (ref 11.7–15.7)
INR PPP: 4.21 (ref 0.86–1.14)
MCH RBC QN AUTO: 31.8 PG (ref 26.5–33)
MCHC RBC AUTO-ENTMCNC: 32.2 G/DL (ref 31.5–36.5)
MCV RBC AUTO: 99 FL (ref 78–100)
PLATELET # BLD AUTO: 250 10E9/L (ref 150–450)
POTASSIUM SERPL-SCNC: 3.4 MMOL/L (ref 3.4–5.3)
RBC # BLD AUTO: 3.27 10E12/L (ref 3.8–5.2)
SODIUM SERPL-SCNC: 143 MMOL/L (ref 133–144)
WBC # BLD AUTO: 6.9 10E9/L (ref 4–11)

## 2019-11-25 PROCEDURE — 85018 HEMOGLOBIN: CPT | Performed by: NURSE PRACTITIONER

## 2019-11-25 PROCEDURE — 97116 GAIT TRAINING THERAPY: CPT | Mod: GP | Performed by: PHYSICAL THERAPIST

## 2019-11-25 PROCEDURE — G0378 HOSPITAL OBSERVATION PER HR: HCPCS

## 2019-11-25 PROCEDURE — 74018 RADEX ABDOMEN 1 VIEW: CPT | Mod: TC

## 2019-11-25 PROCEDURE — 25000128 H RX IP 250 OP 636: Performed by: NURSE PRACTITIONER

## 2019-11-25 PROCEDURE — 99203 OFFICE O/P NEW LOW 30 MIN: CPT | Performed by: SURGERY

## 2019-11-25 PROCEDURE — 96376 TX/PRO/DX INJ SAME DRUG ADON: CPT

## 2019-11-25 PROCEDURE — 85027 COMPLETE CBC AUTOMATED: CPT | Performed by: NURSE PRACTITIONER

## 2019-11-25 PROCEDURE — 25000132 ZZH RX MED GY IP 250 OP 250 PS 637: Performed by: NURSE PRACTITIONER

## 2019-11-25 PROCEDURE — 25000132 ZZH RX MED GY IP 250 OP 250 PS 637: Performed by: SURGERY

## 2019-11-25 PROCEDURE — 97161 PT EVAL LOW COMPLEX 20 MIN: CPT | Mod: GP | Performed by: PHYSICAL THERAPIST

## 2019-11-25 PROCEDURE — 85610 PROTHROMBIN TIME: CPT | Performed by: NURSE PRACTITIONER

## 2019-11-25 PROCEDURE — 99225 ZZC SUBSEQUENT OBSERVATION CARE,LEVEL II: CPT | Performed by: NURSE PRACTITIONER

## 2019-11-25 PROCEDURE — 80048 BASIC METABOLIC PNL TOTAL CA: CPT | Performed by: NURSE PRACTITIONER

## 2019-11-25 PROCEDURE — 36415 COLL VENOUS BLD VENIPUNCTURE: CPT | Performed by: NURSE PRACTITIONER

## 2019-11-25 PROCEDURE — 99207 ZZC CDG-CODE CATEGORY CHANGED: CPT | Performed by: NURSE PRACTITIONER

## 2019-11-25 PROCEDURE — 96375 TX/PRO/DX INJ NEW DRUG ADDON: CPT

## 2019-11-25 PROCEDURE — 97530 THERAPEUTIC ACTIVITIES: CPT | Mod: GP | Performed by: PHYSICAL THERAPIST

## 2019-11-25 PROCEDURE — 71045 X-RAY EXAM CHEST 1 VIEW: CPT | Mod: TC

## 2019-11-25 PROCEDURE — 25000132 ZZH RX MED GY IP 250 OP 250 PS 637: Performed by: FAMILY MEDICINE

## 2019-11-25 RX ORDER — POLYETHYLENE GLYCOL 3350 17 G/17G
17 POWDER, FOR SOLUTION ORAL DAILY
Status: DISCONTINUED | OUTPATIENT
Start: 2019-11-25 | End: 2019-11-28 | Stop reason: HOSPADM

## 2019-11-25 RX ORDER — AMLODIPINE BESYLATE 2.5 MG/1
2.5 TABLET ORAL EVERY EVENING
Status: DISCONTINUED | OUTPATIENT
Start: 2019-11-25 | End: 2019-11-28 | Stop reason: HOSPADM

## 2019-11-25 RX ORDER — AMLODIPINE BESYLATE 5 MG/1
5 TABLET ORAL EVERY MORNING
Status: DISCONTINUED | OUTPATIENT
Start: 2019-11-26 | End: 2019-11-28 | Stop reason: HOSPADM

## 2019-11-25 RX ORDER — ACETAMINOPHEN 325 MG/1
975 TABLET ORAL EVERY 8 HOURS
Status: DISCONTINUED | OUTPATIENT
Start: 2019-11-25 | End: 2019-11-28 | Stop reason: HOSPADM

## 2019-11-25 RX ADMIN — ACETAMINOPHEN 650 MG: 325 TABLET, FILM COATED ORAL at 05:54

## 2019-11-25 RX ADMIN — AMLODIPINE BESYLATE 2.5 MG: 2.5 TABLET ORAL at 08:15

## 2019-11-25 RX ADMIN — SENNOSIDES AND DOCUSATE SODIUM 2 TABLET: 8.6; 5 TABLET ORAL at 20:10

## 2019-11-25 RX ADMIN — KETOROLAC TROMETHAMINE 15 MG: 15 INJECTION, SOLUTION INTRAMUSCULAR; INTRAVENOUS at 17:55

## 2019-11-25 RX ADMIN — ACETAMINOPHEN 975 MG: 325 TABLET, FILM COATED ORAL at 22:46

## 2019-11-25 RX ADMIN — ONDANSETRON 4 MG: 2 INJECTION INTRAMUSCULAR; INTRAVENOUS at 09:58

## 2019-11-25 RX ADMIN — POTASSIUM CHLORIDE 20 MEQ: 1500 TABLET, EXTENDED RELEASE ORAL at 01:48

## 2019-11-25 RX ADMIN — ACETAMINOPHEN 975 MG: 325 TABLET, FILM COATED ORAL at 14:04

## 2019-11-25 RX ADMIN — ACETAMINOPHEN 650 MG: 325 TABLET, FILM COATED ORAL at 01:48

## 2019-11-25 RX ADMIN — POLYETHYLENE GLYCOL 3350 17 G: 17 POWDER, FOR SOLUTION ORAL at 10:45

## 2019-11-25 RX ADMIN — SENNOSIDES AND DOCUSATE SODIUM 1 TABLET: 8.6; 5 TABLET ORAL at 08:15

## 2019-11-25 RX ADMIN — AMLODIPINE BESYLATE 2.5 MG: 2.5 TABLET ORAL at 20:10

## 2019-11-25 RX ADMIN — KETOROLAC TROMETHAMINE 15 MG: 15 INJECTION, SOLUTION INTRAMUSCULAR; INTRAVENOUS at 04:01

## 2019-11-25 NOTE — PROGRESS NOTES
SPIRITUAL HEALTH SERVICES  SPIRITUAL ASSESSMENT Progress Note  [facility]      During Rounding,  introduced himself to Marizol Cabrera and informed her of his availability.    Chung Sheth M.Div., [facility]  Staff   Office tel: [phone]

## 2019-11-25 NOTE — DISCHARGE INSTRUCTIONS
Discharge follow up appointment:  Dr. ROSEY Kelly  Monday Dec. 2nd at 12:30pm  [address]  [facility] [address] [phone]

## 2019-11-25 NOTE — PROVIDER NOTIFICATION
Provider notified patient c/o belly pain.  Not nausea.  Belly tender and slightly firm/distended.  Concerns for bleeding due to fall and INR 4.21

## 2019-11-25 NOTE — PHARMACY-ANTICOAGULATION SERVICE
Clinical Pharmacy - Warfarin Dosing Consult     Pharmacy has been consulted to manage this patient s warfarin therapy.  Indication: Atrial Fibrillation  Therapy Goal: INR 2-3  Warfarin Prior to Admission: Yes  Warfarin PTA Regimen: 10 mg on Sunday, 7.5 mg all other days    INR   Date Value Ref Range Status   11/25/2019 4.21 (H) 0.86 - 1.14 Final   11/24/2019 3.30 (H) 0.86 - 1.14 Final       Recommend warfarin HOLDING dose today.  Pharmacy will monitor Estela Cabrera daily and order warfarin doses to achieve specified goal.      Please contact pharmacy as soon as possible if the warfarin needs to be held for a procedure or if the warfarin goals change.

## 2019-11-25 NOTE — PROGRESS NOTES
"Pt arrived to room 254. Pt settled in room. C/O some pain in left lower back with with movement. BP's elevated 163/73.    BP (!) 163/73   Pulse 83   Temp 96.4  F (35.8  C) (Oral)   Resp 18   Ht 1.676 m (5' 6\")   Wt 65.9 kg (145 lb 3.2 oz)   SpO2 98%   BMI 23.44 kg/m      "

## 2019-11-25 NOTE — PROGRESS NOTES
S-(situation): Patient registered to Observation. Patient arrived to room 254 via cart from ED    B-(background): L rib fx 7-10    A-(assessment): pt is alert and oriented x4. Has pain with movement and with taking deep breaths, otherwise states is comfortable at rest. Swelling noted to anterior rib cage just below L breast. Lidocaine patches x2 to L back. Lung sounds are clear, will instruct on incentive spirometer.     R-(recommendations): Orders and observation goals reviewed with pt and significant other. Will closely monitor respiratory status, pain control, labs, vs.     Nursing Observation criteria listed below was met:    Skin issues/needs documented:NA  Isolation needs addressed, if appropriate: NA  Fall Prevention: Education given and documented: Yes  Education Assessment documented:Yes  Education Documented (Pre-existing chronic infection such as, MRSA/VRE need education on admission): Yes  OBS video/handout Reviewed & DocumentedYes  Medication Reconciliation Complete: Yes  New medication patient education completed and documented (Possible Side Effects of Common Medications handout): Yes  Home medications if not able to send immediately home with family stored here: NA  Reminder note placed in discharge instructions: NA  Patient has discharge needs (If yes, please explain): No- plans to discharge to home with significant other

## 2019-11-25 NOTE — CONSULTS
"Patient seen in consultation for left posterior rib fractures and TP fractures by Olga Aguilar    HPI:  Patient is a 84 year old female s/p fall from standing yesterday onto her left back. She hit a chest on the way down and was unable to stand up immediately afterward due to the pain and she also has a \"bad knee\". She was eventually able to right herself after crawling to a piece of furniture to use to get up. She was evaluated in the ED and was found to have several mild posterior left rib fracture of ribs 7-10. She also has TP fracture of T8-9. Today she has a mild new ache of the neck, no decreased ROM and no central tenderness. No other new complaints. She is on coumadin for a-fib and her INR today was 4.2. She denies SOB    Review Of Systems    Skin: negative  Ears/Nose/Throat: negative  Respiratory: No shortness of breath, dyspnea on exertion, cough, or hemoptysis  Cardiovascular: negative  Gastrointestinal: negative  Genitourinary: negative  Musculoskeletal: as above  Neurologic: negative  Hematologic/Lymphatic/Immunologic: negative  Endocrine: negative      Past Medical History:   Diagnosis Date     Atrial fibrillation (H)      Chronic atrial fibrillation 8/1/2018     Diverticulitis      High blood pressure      HTN, goal below 140/90 2/15/2013     Long term current use of anticoagulant therapy 11/24/2019     Thyroid disease      Vertigo        Past Surgical History:   Procedure Laterality Date     ankle surgeries       HYSTERECTOMY       TONSILLECTOMY         Family History   Problem Relation Age of Onset     Hypertension Mother      Thyroid Disease Mother      Macular Degeneration Mother      Hypertension Sister      Thyroid Disease Sister      Hypertension Daughter      Thyroid Disease Daughter      Glaucoma No family hx of        Social History     Socioeconomic History     Marital status:      Spouse name: Not on file     Number of children: Not on file     Years of education: Not on file " "    Highest education level: Not on file   Occupational History     Not on file   Social Needs     Financial resource strain: Not on file     Food insecurity:     Worry: Not on file     Inability: Not on file     Transportation needs:     Medical: Not on file     Non-medical: Not on file   Tobacco Use     Smoking status: Never Smoker     Smokeless tobacco: Never Used   Substance and Sexual Activity     Alcohol use: No     Drug use: No     Sexual activity: Not Currently   Lifestyle     Physical activity:     Days per week: Not on file     Minutes per session: Not on file     Stress: Not on file   Relationships     Social connections:     Talks on phone: Not on file     Gets together: Not on file     Attends Protestant service: Not on file     Active member of club or organization: Not on file     Attends meetings of clubs or organizations: Not on file     Relationship status: Not on file     Intimate partner violence:     Fear of current or ex partner: Not on file     Emotionally abused: Not on file     Physically abused: Not on file     Forced sexual activity: Not on file   Other Topics Concern     Not on file   Social History Narrative     Not on file       No current outpatient medications on file.       Medications and history reviewed    Physical exam:  Vitals: BP (!) 144/73   Pulse 70   Temp 96.9  F (36.1  C) (Oral)   Resp 18   Ht 1.676 m (5' 6\")   Wt 65.9 kg (145 lb 3.2 oz)   SpO2 97%   BMI 23.44 kg/m    BMI= Body mass index is 23.44 kg/m .    Constitutional: Healthy, alert, non-distressed   Head: Normo-cephalic, atraumatic, no lesions, masses or tenderness   Cervical: muscle soreness with palpation, no central tenderness, no decreased ROM, no central tenderness with flexion or extension  Cardiovascular: RRR, no new murmurs, +S1, +S2 heart sounds, no clicks, rubs or gallops   Respiratory: CTAB, no rales, rhonchi or wheezing, equal chest rise, good respiratory effort, able to take a deep breath but there " is pain, no crepitus  Gastrointestinal: Soft, non-tender, non distended, no rebound rigidity or guarding, no masses or hernias palpated   : Deferred  Musculoskeletal: Moves all extremities, normal  strength, arthritic changes to bilateral hands  Skin: No suspicious lesions or rashes   Psychiatric: Mentation appears normal, affect appropriate   Hematologic/Lymphatic/Immunologic: Normal cervical and supraclavicular lymph nodes   Patient able to get up on table with moderate difficulty.    Labs show:  Results for orders placed or performed during the hospital encounter of 11/24/19 (from the past 24 hour(s))   CBC with platelets differential   Result Value Ref Range    WBC 11.6 (H) 4.0 - 11.0 10e9/L    RBC Count 3.61 (L) 3.8 - 5.2 10e12/L    Hemoglobin 11.6 (L) 11.7 - 15.7 g/dL    Hematocrit 35.5 35.0 - 47.0 %    MCV 98 78 - 100 fl    MCH 32.1 26.5 - 33.0 pg    MCHC 32.7 31.5 - 36.5 g/dL    RDW 12.6 10.0 - 15.0 %    Platelet Count 296 150 - 450 10e9/L    Diff Method Automated Method     % Neutrophils 74.9 %    % Lymphocytes 13.5 %    % Monocytes 8.2 %    % Eosinophils 2.3 %    % Basophils 0.6 %    % Immature Granulocytes 0.5 %    Nucleated RBCs 0 0 /100    Absolute Neutrophil 8.6 (H) 1.6 - 8.3 10e9/L    Absolute Lymphocytes 1.6 0.8 - 5.3 10e9/L    Absolute Monocytes 1.0 0.0 - 1.3 10e9/L    Absolute Basophils 0.1 0.0 - 0.2 10e9/L    Abs Immature Granulocytes 0.1 0 - 0.4 10e9/L    Absolute Nucleated RBC 0.0    Basic metabolic panel   Result Value Ref Range    Sodium 142 133 - 144 mmol/L    Potassium 3.0 (L) 3.4 - 5.3 mmol/L    Chloride 103 94 - 109 mmol/L    Carbon Dioxide 31 20 - 32 mmol/L    Anion Gap 8 3 - 14 mmol/L    Glucose 86 70 - 99 mg/dL    Urea Nitrogen 19 7 - 30 mg/dL    Creatinine 0.94 0.52 - 1.04 mg/dL    GFR Estimate 55 (L) >60 mL/min/[1.73_m2]    GFR Estimate If Black 64 >60 mL/min/[1.73_m2]    Calcium 9.4 8.5 - 10.1 mg/dL   INR   Result Value Ref Range    INR 3.30 (H) 0.86 - 1.14   UA reflex to  Microscopic   Result Value Ref Range    Color Urine Straw     Appearance Urine Clear     Glucose Urine Negative NEG^Negative mg/dL    Bilirubin Urine Negative NEG^Negative    Ketones Urine Negative NEG^Negative mg/dL    Specific Gravity Urine 1.006 1.003 - 1.035    Blood Urine Negative NEG^Negative    pH Urine 7.0 5.0 - 7.0 pH    Protein Albumin Urine Negative NEG^Negative mg/dL    Urobilinogen mg/dL 0.0 0.0 - 2.0 mg/dL    Nitrite Urine Negative NEG^Negative    Leukocyte Esterase Urine Small (A) NEG^Negative    Source Unspecified Urine     RBC Urine 1 0 - 2 /HPF    WBC Urine 3 0 - 5 /HPF    Squamous Epithelial /HPF Urine 1 0 - 1 /HPF   CT Chest w Contrast    Narrative    CT CHEST WITH CONTRAST  11/24/2019 3:29 PM    HISTORY:  Fall with posterior contusion over the inferior aspect of  the left scapula and over the left lower costal margin/CVA area.    TECHNIQUE: Scans obtained from the apices through the diaphragm with  IV contrast. 75mL, Isovue 370 IV injected. Radiation dose for this  scan was reduced using automated exposure control, adjustment of the  mA and/or kV according to patient size, or iterative reconstruction  technique.    COMPARISON:  None available.    FINDINGS:  Vascular: Ectasia of the ascending thoracic aorta measuring 3.6 cm in  diameter. No cardiomegaly or pericardial effusion. Multiple prominent  mediastinal lymph nodes, for example 1.2 cm short axis right  pretracheal lymph node (series 2 image 22), could be reactive.  Moderate atherosclerotic vascular calcification of the coronary  arteries and thoracic aorta.    Lungs and pleura: No pleural effusion or pneumothorax. No suspicious  focal pulmonary opacities.    Upper abdomen: Limited evaluation of the upper abdomen due to lack of  coverage.    Bones and soft tissue: Few mildly displaced fractures of the 7th  through the 10th left ribs along the costovertebral junctions (series  7 images 105, 127, 149 and 177) as well as the adjacent 8th and  9th  left transverse processes (series 7 images 135 and 160). Multilevel  degenerative changes of the visualized spine. Diffuse heterogenous  appearance of the thoracic vertebrae with sclerotic and lucent areas  of indeterminate etiology.      Impression    IMPRESSION:   1. Mildly displaced fractures of the 7th through the 10th left ribs  along the costovertebral junctions as well as the adjacent transverse  processes of T8 and T9.  2. Diffuse heterogenous appearance of the thoracic vertebrae of  indeterminate etiology, can be further evaluated with abdominal MRI if  clinically warranted.      LEONIE HIGGINBOTHAM MD   INR   Result Value Ref Range    INR 4.21 (H) 0.86 - 1.14   Basic metabolic panel   Result Value Ref Range    Sodium 143 133 - 144 mmol/L    Potassium 3.4 3.4 - 5.3 mmol/L    Chloride 107 94 - 109 mmol/L    Carbon Dioxide 32 20 - 32 mmol/L    Anion Gap 4 3 - 14 mmol/L    Glucose 91 70 - 99 mg/dL    Urea Nitrogen 15 7 - 30 mg/dL    Creatinine 0.93 0.52 - 1.04 mg/dL    GFR Estimate 57 (L) >60 mL/min/[1.73_m2]    GFR Estimate If Black 66 >60 mL/min/[1.73_m2]    Calcium 8.7 8.5 - 10.1 mg/dL   CBC with platelets   Result Value Ref Range    WBC 6.9 4.0 - 11.0 10e9/L    RBC Count 3.27 (L) 3.8 - 5.2 10e12/L    Hemoglobin 10.4 (L) 11.7 - 15.7 g/dL    Hematocrit 32.3 (L) 35.0 - 47.0 %    MCV 99 78 - 100 fl    MCH 31.8 26.5 - 33.0 pg    MCHC 32.2 31.5 - 36.5 g/dL    RDW 12.6 10.0 - 15.0 %    Platelet Count 250 150 - 450 10e9/L   XR Chest Port 1 View    Narrative    CHEST ONE VIEW PORTABLE   11/25/2019 10:02 AM     HISTORY: follow up rib fractures, shortness of breath, chest wall  pain, anemia, elevated INR    COMPARISON: 5/19/2017 chest x-ray. 11/24/2019 CT chest      Impression    IMPRESSION:   1. Redemonstrated enlarged cardiac silhouette.  2. The left rib fractures and transverse process fractures identified  on the recent CT are not visualized on chest x-ray. No convincing  evidence for pneumothorax, probable  skin fold over the left apex.  3. Stable linear density at the left lung base unchanged since 2017  consistent with scarring. No acute pulmonary disease.  4. Normal caliber pulmonary vessels.    KEO MARIA MD   X-ray Abdomen 1 vw    Narrative    ABDOMEN ONE VIEW  11/25/2019 10:03 AM     HISTORY: Abdomen pain, elevated INR, anemia. History of fall.    COMPARISON: None.      FINDINGS:  There is a nonspecific bowel gas pattern with gas in both  small and large bowel. No obvious renal or ureteral calculus is seen.  No abnormally dilated gas-filled loops of bowel to suggest bowel  obstruction. Moderate amount of stool is projected over the colon.      Impression    IMPRESSION:  1. Nonspecific bowel gas pattern without evidence for bowel  obstruction.  2. Moderate amount of stool in the colon.     Assessment:     ICD-10-CM    1. Fall, initial encounter W19.XXXA INR     Basic metabolic panel     CBC with platelets     CANCELED: Potassium   2. Closed fracture of multiple ribs of left side, initial encounter S22.42XA    3. Fracture of thoracic transverse process, closed, initial encounter (H) S22.009A    4. Closed fracture of eighth thoracic vertebra, unspecified fracture morphology, initial encounter (H) S22.069A    5. Closed fracture of tenth thoracic vertebra, unspecified fracture morphology, initial encounter (H) S22.079A    6. Long term (current) use of anticoagulants Z79.01    7. Fall on same level from slipping, tripping and stumbling with subsequent striking against furniture, initial encounter W01.190A      Plan: Her pain is tolerable at this time with the current regimen of Toradol, tylenol and Lidoderm patches. Currently the patch is off, but this could be replaced later after 12 hr skin relief. Agree with holding coumadin until INR naturally comes down, no need for reversal agents as no interventions planned. PT/OT to evaluate and treat. She is willing to use walker if necessary to be able to go home  compared with SNF or other rehab facility. No indications for any additional imaging at this time. TP fractures typically treated with PT and gradual increase of ROM but if she is unable to ambulate or participate in PT a brace could be considered.    Jose Alfredo Reyna, DO

## 2019-11-25 NOTE — PLAN OF CARE
Discharge Planner PT   Patient plan for discharge:  Home with significant other  Current status: Patient is a 84 year old female status post fall with back pain, 4 rib fractures (7-10) and transverse process T8-9 fracture. Patient with previous medical history of AFib, migraine, HTN, CVA and vertigo. Prior to admission patient living in a single family home with her significant other. The home has 5 stairs with bilateral hand rails to access, and 10 stairs up and down within the home, however patient able to sleep on the main level as well as upstairs, she has a walk in shower on both levels. The home is set up with raised toilets. She has a 4WW available if needed at discharge. Prior to admission patient ambulating IND, transfer IND, managing her home IND, house keeping, book keeping IND. Her SO drives as she is unable to since her stroke. She admits to three falls in the past 6 months. Currently, patient with pain with overhead reaching of the LUE, left flank pain and pain with left hip flexion. Fair strength throughout however guarded due to left rib and back pain. Educated in log roll rolling and supine to/from sitting EOB with mod assist for each initially progressed to IND with repeat trials and teaching. Patient educated regarding spine and rib fracture precautions, is IND in incentive spirometer. CGA sit to/from stand from EOB. Ambulated 125 ft without assistive device (per baseline mobility) with CGA-Min assist for safety, with minimal toe clearance, excessive trunk sway and weaving gait pattern. Ascend/descend 5 stairs with bilateral hand rails, SBA for safety with verbal cues to avoid pulling on the railing. Ambulated 125 ft with 2WW, SBA, improved toe clearance and decreased fear of falling. Patient provided spine health handout and reviewed topics with patient.   Barriers to return to prior living situation: Medical status, pain control, risk of falling, impaired functional mobility  Recommendations for  discharge: Home with 24/7 assistance, walker from home  Rationale for recommendations: Patient tolerated today's mobilization and increased activity fair. She expressed significant pain with mobility, with education on positioning and movement strategies patient's pain improved. Anticipate patient to do well with discharge home when pain is management with mobilization. Patient agreeable to use of walker at home until she feels stronger.        Entered by: Symone Jiang 11/25/2019 3:35 PM     Thank you for your referral.    Symone Jiang, PT, DPT, ATC    [facility]    O: [phone]  E: [email or web address]

## 2019-11-25 NOTE — PROGRESS NOTES
11/25/19 1415   Quick Adds   Type of Visit Initial PT Evaluation       Present no   Living Environment   Lives With significant other   Living Arrangements house   Home Accessibility stairs to enter home;stairs within home   Number of Stairs, Main Entrance 5   Stair Railings, Main Entrance railings on both sides of stairs;railings safe and in good condition   Number of Stairs, Within Home, Primary 10  (up and down)   Stair Railings, Within Home, Primary railing on left side (ascending);railings safe and in good condition   Transportation Anticipated family or friend will provide   Living Environment Comment Man friend present 24/7 and able to physically assist when needed. Able to stay on one level upon entry to home- bedroom and bathroom on both floors with walk in shower. Flat bed downstairs, upstairs bed elevates.    Self-Care   Usual Activity Tolerance good   Current Activity Tolerance moderate   Regular Exercise No   Equipment Currently Used at Home walker, rolling;cane, straight;grab bar, tub/shower;raised toilet  (4WW)   Activity/Exercise/Self-Care Comment IND in housekeeping, cooking, laundry, book keeping. Patient no longer driving. Completes grocery shopping with SO   Functional Level Prior   Ambulation 0-->independent   Transferring 0-->independent   Toileting 1-->assistive equipment  (press up on vanity)   Bathing 0-->independent   Communication 0-->understands/communicates without difficulty   Swallowing 0-->swallows foods/liquids without difficulty  (difficulty with large pills- good strategies)   Cognition 0 - no cognition issues reported   Fall history within last six months yes   Number of times patient has fallen within last six months 3   Which of the above functional risks had a recent onset or change? fall history   General Information   Onset of Illness/Injury or Date of Surgery - Date 11/24/19   Referring Physician Olga Aguilar NP   Patient/Family Goals Statement  Return home with SO   Pertinent History of Current Problem (include personal factors and/or comorbidities that impact the POC) Patient is a 84 year old female status post fall with back pain, 4 rib fractures (7-10) and transverse process T8-9 fracture. Patient with previous medical history of AFib, migraine, HTN and vertigo.   Precautions/Limitations fall precautions   Weight-Bearing Status - LUE full weight-bearing   Weight-Bearing Status - RUE full weight-bearing   Weight-Bearing Status - LLE full weight-bearing   Weight-Bearing Status - RLE full weight-bearing   General Info Comments PT orders: evaluate and treat status post rall, rib fracture and weakness. Activity orders: up with assistance, incentive spirometer   Cognitive Status Examination   Orientation orientation to person, place and time   Level of Consciousness alert   Follows Commands and Answers Questions 100% of the time;able to follow multistep instructions   Personal Safety and Judgment intact   Memory intact   Pain Assessment   Patient Currently in Pain Yes, see Vital Sign flowsheet  (1/10 at rest. With movement increases)   Integumentary/Edema   Integumentary/Edema no deficits were identifed   Integumentary/Edema Comments ecchymosis left posterior lateral ribs   Posture    Posture Forward head position;Protracted shoulders;Kyphosis   Range of Motion (ROM)   ROM Comment limited by pain   Strength   Strength Comments limited by pain, impaired muscular endurance   Bed Mobility   Bed Mobility Bed mobility skill: Rolling/Turning;Bed mobility skill: Scooting/Bridging;Bed mobility skill: Sit to supine;Bed mobility skill: Supine to sit   Bed Mobility Skill: Rolling/Turning   Level of [place]: Rolling/Turning moderate assist (50% patients effort)   Physical/Nonphysical Assist: Rolling/Turning supervision;verbal cues;1 person assist   Bed Mobility Skill: Scooting/Bridging   Level of [place]: Scooting/Bridging independent   Physical/Nonphysical  Assist: Scooting/Bridging supervision;verbal cues   Bed Mobility Skill: Sit to Supine   Level of [place]: Sit/Supine moderate assist (50% patients effort)   Physical Assist/Nonphysical Assist: Sit/Supine supervision;verbal cues   Bed Mobility Skill: Supine to Sit   Level of [place]: Supine/Sit moderate assist (50% patients effort)   Physical Assist/Nonphysical Assist: Supine/Sit supervision;verbal cues   Transfer Skills   Transfer Transfer Skill: Sit to Stand;Transfer Skill:  Stand to Sit   Transfer Skill:  Sit to Stand   Level of [place]: Sit/Stand contact guard   Physical Assist/Nonphysical Assist: Sit/Stand supervision;verbal cues   Weightbearing Restrictions: Sit/Stand weight-bearing as tolerated   Transfer Skill: Stand to Sit   Level of [place]: Stand/Sit contact guard   Physical Assist/Nonphysical Assist: Stand/Sit supervision;verbal cues   Weight-Bearing Restrictions: Stand/Sit weight-bearing as tolerated   Gait   Gait Gait Skill;Gait Analysis;Stairs   Gait Skills   Level of [place]: Gait contact guard   Physical Assist/Nonphysical Assist: Gait supervision;verbal cues   Weight-Bearing Restrictions: Gait weight-bearing as tolerated   Assistive Device for Transfer: Gait rolling walker   Gait Distance   (125 ft)   Gait Analysis   Gait Pattern Used swing-through gait   Gait Deviations Noted decreased toe-to-floor clearance;decreased stride length;decreased step length;increased time in double stance;decreased tanna   Impairments Contributing to Gait Deviations decreased ROM;pain;decreased strength;impaired balance   Stairs   Self Performance Stand by assist   Physical/Nonphysical Assist: Stairs Set-up or supervision only   Rails 2 rails   Indicate number of stairs 5   Balance   Balance Comments intermittent LOB without walker, min assist to recovery, walker improved stability. short sitting IND balance.   Sensory Examination   Sensory Perception no deficits were identified  "  Coordination   Coordination no deficits were identified   Muscle Tone   Muscle Tone no deficits were identified   General Therapy Interventions   Planned Therapy Interventions balance training;gait training;strengthening;home program guidelines;transfer training   Clinical Impression   Criteria for Skilled Therapeutic Intervention yes, treatment indicated   PT Diagnosis weakness, impaired gait, impaired balance, joint stiffness   Influenced by the following impairments status post fall with rib and transverse process fracture   Functional limitations due to impairments pain, decreased activity tolerance, increased risk of falling   Clinical Presentation Evolving/Changing   Clinical Presentation Rationale Patient with increased pain with mobilization    Clinical Decision Making (Complexity) Low complexity   Therapy Frequency Daily   Predicted Duration of Therapy Intervention (days/wks) 1-2 days   Anticipated Equipment Needs at Discharge front wheeled walker   Anticipated Discharge Disposition Home with Assist   Risk & Benefits of therapy have been explained Yes   Patient, Family & other staff in agreement with plan of care Yes   Clinical Impression Comments Patient tolerated today's mobilization and increased activity fair. She expressed significant pain with mobility, with education on positioning and movement strategies patient's pain improved. Anticipate patient to do well with discharge home when pain is management with mobilization. Patient agreeable to use of walker at home until she feels stronger.    [facility] [employer] TM \"6 Clicks\"   2016, Trustees of [facility], under license to [employer].  All rights reserved.   6 Clicks Short Forms Basic Mobility Inpatient Short Form   [facility] AM-PAC  \"6 Clicks\" V.2 Basic Mobility Inpatient Short Form   1. Turning from your back to your side while in a flat bed without using bedrails? 3 - A Little   2. Moving from lying on your back to sitting " on the side of a flat bed without using bedrails? 3 - A Little   3. Moving to and from a bed to a chair (including a wheelchair)? 4 - None   4. Standing up from a chair using your arms (e.g., wheelchair, or bedside chair)? 4 - None   5. To walk in hospital room? 4 - None   6. Climbing 3-5 steps with a railing? 4 - None   Basic Mobility Raw Score (Score out of 24.Lower scores equate to lower levels of function) 22   Total Evaluation Time   Total Evaluation Time (Minutes) 10     Thank you for your referral.    Symone Jiang, PT, DPT, ATC    [facility]    O: [phone]  E: [email or web address]

## 2019-11-25 NOTE — PROGRESS NOTES
[facility]    Medicine Progress Note - Hospitalist Service       Date of Admission:  11/24/2019  Assessment & Plan     Estela Cabrera is a 84 year old female admitted on 11/24/2019. She presented to the emergency department complaining of back pain after a fall.  Patient was trying on her shoes and trying to get one off when he she lost her balance and fell backwards.  Patient's back hit a cedar chest on the left side.  Patient had immediate pain that was worse with movement and breathing.  Pain does not radiate into the abdomen, neck or chest.  In the emergency department a CT showed multiple rib fractures on the left side of T7-10 with fractures of the transverse process in the T8 and 9.  Patient also on Coumadin for atrial fibrillation with an INR of 3.3.  There is a moderate amount of swelling on the left side of her back. Will admit to observation for pain management and physical therapy evaluation.       Closed fracture of transverse process of thoracic vertebra (H) - T8-T9    Closed fracture of multiple ribs of left side - T7-10  Assessment: Patient presented after a fall to the emergency department as above.  Patient with noted fractures on her ribs and transverse process of the T8 and 9.  Patient will be admitted to observation status for pain control and consult to surgery team for trauma. 11/25: Patient is feeling a little better, her pain is better. Patient up with assist of 1, unsteady on her feet. Patient did better with physical therapy. INR elevated, no bleeding. Hematoma seems to be slightly larger on her left back. CXR done. Patient did have some abd pain, xray showed constipation. Hemoglobin dropped 1 gram but has been stable.   Plan:  Physical therapy ongoing for home safety and ambulation.  Pain management including Lidoderm patches, Vicodin, ibuprofen and Tylenol.  Incentive spirometry. Plan for home in the morning after physical therapy.       HTN, goal below  140/90  Assessment: Chronic and stable with current blood pressure high normal. Patient normally takes Norvasc 2.5 mg twice a day and Dyazide 37-25 daily..  Plan: We will monitor blood pressure closely and continue home medications.  Elevation likely due to pain.      Long term current use of anticoagulant therapy    Chronic atrial fibrillation  Assessment: Patient with chronic atrial fibrillation currently on Coumadin with INR of 3.3.  Plan: We will continue Coumadin with recheck of INR tomorrow.  Will need to monitor patient's site of fall with rib fractures and possible hematoma.      Fall  Assessment: Patient presented after fall as above at home.  Patient tripped while trying to take off her shoe and fell backwards into a cedar chest.  Plan: Plan as above.  Surgical consult.  Physical therapy evaluation.       Diet: Regular Diet Adult  Room Service    DVT Prophylaxis: Ambulate every shift  Hernandez Catheter: not present  Code Status: Full Code      Disposition Plan   Expected discharge: Tomorrow, recommended to prior living arrangement once adequate pain management/ tolerating PO medications.  Entered: Olga Aguilar CNP 11/25/2019, 5:44 PM       The patient's care was discussed with the Attending Physician, Dr. Sotomayor, Bedside Nurse, Care Coordinator/, Patient and Patient's Family.    Olga Aguilar CNP  Hospitalist Service  [facility]    ______________________________________________________________________    Interval History   Patient is overall improved, pain is managed with medications. Patient was up walking, unsteady. Eating and drinking ok. Patient has some abd pain, xray showed constipation. Patient CXR did not show hematoma or pneumothorax. Afebrile and hemodynamically stable. Patient hemoglobin has been stable, patient INR elevated. No other sites of bleeding.  10 point ROS neg other than the symptoms noted above.       Data reviewed today: I  reviewed all medications, new labs and imaging results over the last 24 hours.      Physical Exam   Vital Signs: Temp: 97.3  F (36.3  C) Temp src: Oral BP: (!) 182/73 Pulse: 76 Heart Rate: 65 Resp: 16 SpO2: 97 % O2 Device: None (Room air)    Weight: 145 lbs 3.2 oz    Constitutional: awake, alert, cooperative, mild distress due to pain   Eyes: Lids and lashes normal, pupils equal, round and reactive to light, extra ocular muscles intact, sclera clear, conjunctiva normal  Respiratory: No increased work of breathing, good air exchange, clear to auscultation bilaterally, no crackles or wheezing  Cardiovascular: Normal apical impulse, iregular rate and rhythm, atrial fibrillation  GI: Normal bowel sounds, soft, non-distended, non-tender. Patient states she has had blood per rectum chronically.    Skin: scattered bruising, no bleeding and normal skin color, texture, turgor  Musculoskeletal: There is no redness, warmth, or swelling of the joints.  Full range of motion noted.   Neurologic: Awake, alert, oriented to name, place and time.  Cranial nerves II-XII are grossly intact.     Data   Recent Labs   Lab 11/25/19  1404 11/25/19  0533 11/24/19  1406   WBC  --  6.9 11.6*   HGB 10.5* 10.4* 11.6*   MCV  --  99 98   PLT  --  250 296   INR  --  4.21* 3.30*   NA  --  143 142   POTASSIUM  --  3.4 3.0*   CHLORIDE  --  107 103   CO2  --  32 31   BUN  --  15 19   CR  --  0.93 0.94   ANIONGAP  --  4 8   JOSE CARLOS  --  8.7 9.4   GLC  --  91 86     Recent Results (from the past 24 hour(s))   XR Chest Port 1 View    Narrative    CHEST ONE VIEW PORTABLE   11/25/2019 10:02 AM     HISTORY: follow up rib fractures, shortness of breath, chest wall  pain, anemia, elevated INR    COMPARISON: 5/19/2017 chest x-ray. 11/24/2019 CT chest      Impression    IMPRESSION:   1. Redemonstrated enlarged cardiac silhouette.  2. The left rib fractures and transverse process fractures identified  on the recent CT are not visualized on chest x-ray. No  convincing  evidence for pneumothorax, probable skin fold over the left apex.  3. Stable linear density at the left lung base unchanged since 2017  consistent with scarring. No acute pulmonary disease.  4. Normal caliber pulmonary vessels.    KEO MARIA MD   X-ray Abdomen 1 vw    Narrative    ABDOMEN ONE VIEW  11/25/2019 10:03 AM     HISTORY: Abdomen pain, elevated INR, anemia. History of fall.    COMPARISON: None.      FINDINGS:  There is a nonspecific bowel gas pattern with gas in both  small and large bowel. No obvious renal or ureteral calculus is seen.  No abnormally dilated gas-filled loops of bowel to suggest bowel  obstruction. Moderate amount of stool is projected over the colon.      Impression    IMPRESSION:  1. Nonspecific bowel gas pattern without evidence for bowel  obstruction.  2. Moderate amount of stool in the colon.    SEAN HICKS MD

## 2019-11-25 NOTE — PROGRESS NOTES
VSS. Pt reports pain on the left posterior rib cage. Denies any intervention for pain management. Bruising noted to back where patient reports pain. Lung sounds clear. Alert and orientated. Pt complaining of stomach pain. Reported to provider. Xray obtained. Miralax ordered and given per MAR. Pt. Reported nausea. Zofran given and nausea resolved. On potassium protocol with no interventions per shift. Assist of 1 with gait belt. Will continue to monitor.

## 2019-11-25 NOTE — PHARMACY-ANTICOAGULATION SERVICE
Clinical Pharmacy - Warfarin Dosing Consult     Pharmacy has been consulted to manage this patient s warfarin therapy.  Indication: Atrial Fibrillation  Therapy Goal: INR 2-3  Warfarin Prior to Admission: Yes  Warfarin PTA Regimen: 10 mg on Sunday, 7.5 mg all other days    INR   Date Value Ref Range Status   11/24/2019 3.30 (H) 0.86 - 1.14 Final       Recommend warfarin 0 mg today.  Pharmacy will monitor Estela Cabrera daily and order warfarin doses to achieve specified goal.      Please contact pharmacy as soon as possible if the warfarin needs to be held for a procedure or if the warfarin goals change.

## 2019-11-25 NOTE — PROGRESS NOTES
S-(situation): shift note    B-(background): L rib fx 7-10    A-(assessment): pain to L lower back/rib cage controlled with prn tylenol and toradol. Vss. RA 92-97%. Lungs are clear. Has two bruised areas to L back. Slightly swollen to L anterior lower rib cage, ice pack applied. Lidocaine patches x2 to back until 0545 and then removed per order. Pt has been up to the bathroom with assist of one and gait belt. Has pain only with movement, but able to ambulate steady. Using incentive spirometer to 1500.     R-(recommendations): will continue to monitor vs, labs, respiratory status, pain.

## 2019-11-25 NOTE — ED NOTES
ED Nursing criteria listed below was addressed during verbal handoff:     Abnormal vitals: No  Abnormal results: Yes  Med Reconciliation completed: Yes  Meds given in ED: Yes  Any Overdue Meds: No  Core Measures: No  Isolation: N/A  Special needs: Yes  Skin assessment: Yes    Observation Patient  Education provided: Yes

## 2019-11-26 ENCOUNTER — APPOINTMENT (OUTPATIENT)
Dept: PHYSICAL THERAPY | Facility: CLINIC | Age: 84
DRG: 184 | End: 2019-11-26
Payer: COMMERCIAL

## 2019-11-26 ENCOUNTER — DOCUMENTATION ONLY (OUTPATIENT)
Dept: ORTHOPEDICS | Facility: CLINIC | Age: 84
End: 2019-11-26

## 2019-11-26 LAB
HGB BLD-MCNC: 11.1 G/DL (ref 11.7–15.7)
INR PPP: 3.65 (ref 0.86–1.14)

## 2019-11-26 PROCEDURE — 25000132 ZZH RX MED GY IP 250 OP 250 PS 637: Performed by: NURSE PRACTITIONER

## 2019-11-26 PROCEDURE — 87081 CULTURE SCREEN ONLY: CPT | Performed by: FAMILY MEDICINE

## 2019-11-26 PROCEDURE — 25000132 ZZH RX MED GY IP 250 OP 250 PS 637: Performed by: FAMILY MEDICINE

## 2019-11-26 PROCEDURE — 25000132 ZZH RX MED GY IP 250 OP 250 PS 637: Performed by: SURGERY

## 2019-11-26 PROCEDURE — 12000000 ZZH R&B MED SURG/OB

## 2019-11-26 PROCEDURE — 25000128 H RX IP 250 OP 636: Performed by: NURSE PRACTITIONER

## 2019-11-26 PROCEDURE — G0378 HOSPITAL OBSERVATION PER HR: HCPCS

## 2019-11-26 PROCEDURE — 85018 HEMOGLOBIN: CPT | Performed by: NURSE PRACTITIONER

## 2019-11-26 PROCEDURE — 96376 TX/PRO/DX INJ SAME DRUG ADON: CPT

## 2019-11-26 PROCEDURE — 85610 PROTHROMBIN TIME: CPT | Performed by: NURSE PRACTITIONER

## 2019-11-26 PROCEDURE — 97530 THERAPEUTIC ACTIVITIES: CPT | Mod: GP | Performed by: PHYSICAL THERAPIST

## 2019-11-26 PROCEDURE — 36415 COLL VENOUS BLD VENIPUNCTURE: CPT | Performed by: NURSE PRACTITIONER

## 2019-11-26 PROCEDURE — 99233 SBSQ HOSP IP/OBS HIGH 50: CPT | Performed by: NURSE PRACTITIONER

## 2019-11-26 RX ORDER — FAMOTIDINE 20 MG/1
20 TABLET, FILM COATED ORAL 2 TIMES DAILY
Status: DISCONTINUED | OUTPATIENT
Start: 2019-11-26 | End: 2019-11-27

## 2019-11-26 RX ORDER — POLYETHYLENE GLYCOL 3350 17 G/17G
17 POWDER, FOR SOLUTION ORAL DAILY
Qty: 30 PACKET | Refills: 0 | Status: SHIPPED | OUTPATIENT
Start: 2019-11-27

## 2019-11-26 RX ORDER — AMOXICILLIN 250 MG
1 CAPSULE ORAL 2 TIMES DAILY
Qty: 60 TABLET | Refills: 0 | Status: SHIPPED | OUTPATIENT
Start: 2019-11-26 | End: 2019-11-28

## 2019-11-26 RX ORDER — LEVOTHYROXINE SODIUM 75 UG/1
150 TABLET ORAL
Status: DISCONTINUED | OUTPATIENT
Start: 2019-11-27 | End: 2019-11-28 | Stop reason: HOSPADM

## 2019-11-26 RX ORDER — AMLODIPINE BESYLATE 5 MG/1
5 TABLET ORAL EVERY MORNING
Qty: 30 TABLET | Refills: 0 | Status: SHIPPED | OUTPATIENT
Start: 2019-11-27

## 2019-11-26 RX ORDER — WARFARIN SODIUM 2.5 MG/1
2.5 TABLET ORAL DAILY
COMMUNITY
Start: 2019-11-26 | End: 2019-11-28

## 2019-11-26 RX ORDER — HYDROCODONE BITARTRATE AND ACETAMINOPHEN 5; 325 MG/1; MG/1
1-2 TABLET ORAL EVERY 4 HOURS PRN
Qty: 15 TABLET | Refills: 0 | Status: SHIPPED | OUTPATIENT
Start: 2019-11-26 | End: 2019-11-28

## 2019-11-26 RX ORDER — TRIAMTERENE/HYDROCHLOROTHIAZID 37.5-25 MG
1 TABLET ORAL DAILY
Status: DISCONTINUED | OUTPATIENT
Start: 2019-11-27 | End: 2019-11-28 | Stop reason: HOSPADM

## 2019-11-26 RX ORDER — AMLODIPINE BESYLATE 2.5 MG/1
2.5 TABLET ORAL EVERY EVENING
Qty: 30 TABLET | Refills: 0 | Status: SHIPPED | OUTPATIENT
Start: 2019-11-26

## 2019-11-26 RX ORDER — HYDROMORPHONE HYDROCHLORIDE 2 MG/1
2 TABLET ORAL 3 TIMES DAILY
Status: DISCONTINUED | OUTPATIENT
Start: 2019-11-26 | End: 2019-11-28 | Stop reason: HOSPADM

## 2019-11-26 RX ORDER — WARFARIN SODIUM 2.5 MG/1
2.5 TABLET ORAL
Status: COMPLETED | OUTPATIENT
Start: 2019-11-26 | End: 2019-11-26

## 2019-11-26 RX ORDER — KETOROLAC TROMETHAMINE 15 MG/ML
15 INJECTION, SOLUTION INTRAMUSCULAR; INTRAVENOUS EVERY 6 HOURS
Status: DISCONTINUED | OUTPATIENT
Start: 2019-11-26 | End: 2019-11-27

## 2019-11-26 RX ORDER — MECLIZINE HCL 12.5 MG 12.5 MG/1
25 TABLET ORAL 3 TIMES DAILY PRN
Status: DISCONTINUED | OUTPATIENT
Start: 2019-11-26 | End: 2019-11-27

## 2019-11-26 RX ADMIN — ACETAMINOPHEN 975 MG: 325 TABLET, FILM COATED ORAL at 22:27

## 2019-11-26 RX ADMIN — ONDANSETRON 4 MG: 2 INJECTION INTRAMUSCULAR; INTRAVENOUS at 12:20

## 2019-11-26 RX ADMIN — SENNOSIDES AND DOCUSATE SODIUM 1 TABLET: 8.6; 5 TABLET ORAL at 08:42

## 2019-11-26 RX ADMIN — SENNOSIDES AND DOCUSATE SODIUM 2 TABLET: 8.6; 5 TABLET ORAL at 20:45

## 2019-11-26 RX ADMIN — FAMOTIDINE 20 MG: 20 TABLET, FILM COATED ORAL at 19:46

## 2019-11-26 RX ADMIN — ACETAMINOPHEN 975 MG: 325 TABLET, FILM COATED ORAL at 05:37

## 2019-11-26 RX ADMIN — WARFARIN SODIUM 2.5 MG: 2.5 TABLET ORAL at 17:27

## 2019-11-26 RX ADMIN — FAMOTIDINE 20 MG: 20 TABLET, FILM COATED ORAL at 13:55

## 2019-11-26 RX ADMIN — KETOROLAC TROMETHAMINE 15 MG: 15 INJECTION, SOLUTION INTRAMUSCULAR; INTRAVENOUS at 11:01

## 2019-11-26 RX ADMIN — KETOROLAC TROMETHAMINE 15 MG: 15 INJECTION, SOLUTION INTRAMUSCULAR; INTRAVENOUS at 22:27

## 2019-11-26 RX ADMIN — ACETAMINOPHEN 975 MG: 325 TABLET, FILM COATED ORAL at 13:55

## 2019-11-26 RX ADMIN — AMLODIPINE BESYLATE 2.5 MG: 2.5 TABLET ORAL at 19:45

## 2019-11-26 RX ADMIN — HYDROMORPHONE HYDROCHLORIDE 2 MG: 2 TABLET ORAL at 20:45

## 2019-11-26 RX ADMIN — HYDROMORPHONE HYDROCHLORIDE 0.3 MG: 1 INJECTION, SOLUTION INTRAMUSCULAR; INTRAVENOUS; SUBCUTANEOUS at 12:20

## 2019-11-26 RX ADMIN — AMLODIPINE BESYLATE 5 MG: 5 TABLET ORAL at 08:41

## 2019-11-26 RX ADMIN — KETOROLAC TROMETHAMINE 15 MG: 15 INJECTION, SOLUTION INTRAMUSCULAR; INTRAVENOUS at 17:27

## 2019-11-26 ASSESSMENT — ACTIVITIES OF DAILY LIVING (ADL)
RETIRED_EATING: 0-->INDEPENDENT
ADLS_ACUITY_SCORE: 16
ADLS_ACUITY_SCORE: 17
DRESS: 0-->INDEPENDENT

## 2019-11-26 NOTE — PLAN OF CARE
Left ribs are more sore this evening. Slight bruising noted. Had refused offers of analgesics earlier in the day, but recently accepted IV Toradol and ice packs to left side with relief. Ambulated to bathroom with walker, gait belt, and SBA to void. No BM as yet today. Poor technique on incentive spirometer was corrected per writer and was able to move the device up to 1000ml 5x. Denies shortness of breath or numbness or tingling. BP was elevated (182/73) after ambulating to and from the bathroom at start of shift. Rechecked to be 152/ 59. Pt stated she usually takes two Norvasc in the morning (instead of the one that she received and had reported upon admission) and one at night. Left pink note for hospitalist. Bed alarm on. Will continue to monitor pain, BP's safety.

## 2019-11-26 NOTE — PLAN OF CARE
"BP (!) 153/73 (BP Location: Left arm)   Pulse 70   Temp 96.9  F (36.1  C) (Oral)   Resp 16   Ht 1.676 m (5' 6\")   Wt 65.9 kg (145 lb 3.2 oz)   SpO2 96%   BMI 23.44 kg/m      Vital signs stable.  Afebrile. Lung sounds clear.  IS in use. On RA.  A&O x 4.  Pt complaining of L side pain due to multiple rib fractures T7-10, and transverse process fractures of T8-9.  Patient reluctant to take pain meds, but after a particularly painful episode this morning patient agreed to utilize pain medications.  Given IV pain medications and antiemetics as pain was making patient nauseous.  Provider orders for TLSO brace.  Orthotics dept aware and will be up this afternoon to fit.  Bowel sounds normoactive.  Up with SBA, ambulating to bathroom. Voiding adequately.  SCDs on.  Skin is intact with bruising to left side of back.  IV saline locked, IV site asymptomatic.  Able to make needs known and uses call light appropriately. Will continue to monitor per pt care plan.           "

## 2019-11-26 NOTE — PROGRESS NOTES
S: order received to see patient at 2nd floor [facility] for evalulation for a thoracolumbosacral orthosis (TLSO) as ordered.  O/G: support and stabilize spine with maximum comfort  A: patient seen for eval for a TLSO.  CNP has indicated that they would like a custom TLSO. Multiple measurements were taken to have a brace fabricated by [employer].  Patient needs a custom TLSO due to T7-T10 Fractures and left rib fractures.  Patient/staff have been made aware that brace fitting will take place once the brace is received back in our office.   P: patient will be fit with brace when fabrication is complete, likely tomorrow (11/27/19).  Contact orthotics if any issues arise.     Jose SHEETS/FABIAN

## 2019-11-26 NOTE — PROGRESS NOTES
S- pain  B- patient admitted with multiple rib fx, c/o of nausea, pain  A-patient c/o nausea after ambulating in hallway, then 6/10 back pain, Toradol given at 1100, was ineffective, patients pain increased to 8/10 pain with increased nausea, IV Dilaudid and Zofran given at 1230.  R- Closely monitor patients pain level and offer IV pain medications to manage and control pain.   Krystle Lange, SN

## 2019-11-26 NOTE — PHARMACY-ANTICOAGULATION SERVICE
Clinical Pharmacy - Warfarin Dosing Consult     Pharmacy has been consulted to manage this patient s warfarin therapy.  Indication: Atrial Fibrillation  Therapy Goal: INR 2-3  Warfarin Prior to Admission: Yes  Warfarin PTA Regimen: 10 mg on Sunday, 7.5 mg all other days    INR   Date Value Ref Range Status   11/26/2019 3.65 (H) 0.86 - 1.14 Final   11/25/2019 4.21 (H) 0.86 - 1.14 Final     Last dose was 10 mg on 11/24 and INR trending down    Recommend warfarin 2.5 mg today (decrease of 67% from home dose).  Pharmacy will monitor Estela Cabrera daily and order warfarin doses to achieve specified goal.      Please contact pharmacy as soon as possible if the warfarin needs to be held for a procedure or if the warfarin goals change.

## 2019-11-26 NOTE — PLAN OF CARE
Pt a/o, LS clear, IS 1500, VSS, b/p's high 150/60, scheduled tylenol given at hs for pain, pt has been sleeping most of the night, appears comfortable, SBA with gait belt and walker, ice applied to left ribs.

## 2019-11-26 NOTE — PROGRESS NOTES
S-(situation): Patient changed to inpatient status    B-(background): Patient status change due to observation goals not being met.  Patient's pain not controlled with PO medicaions.  Pain to be managed with IV dilaudid.      A-(assessment): Patient states her pain is worse than yesterday.  Oral medications not effective.  Patient given IV dilaudid and states that her pain is improved.  Will plan to monitor the patient closely.    R-(recommendations):  Utilize IV pain medications to improve pain control. Will monitor patient per MD orders.       Inpatient nursing criteria listed below were met:    Adult Profile completeds  Health care directives status obtained and documented: Yes  VTE prophylaxis orders: SCD  (FYI: Asprin is not an approved anticoagulation for DVT prophylaxis)  SCD's Documented: Yes  Vaccine assessment done and vaccine ordered if needed. Yes  MRSA swab completed for patient 55 years and older (exclude TELLY and TKA): Yes  My Chart patient sign up addressed and documented: No  Care Plan initiated: Yes  Discharge planning review completed (admission navigator) Yes

## 2019-11-26 NOTE — PROGRESS NOTES
[facility]    Medicine Progress Note - Hospitalist Service       Date of Admission:  11/24/2019  Assessment & Plan     Estela Cabrera is a 84 year old female admitted on 11/24/2019. She presented to the emergency department complaining of back pain after a fall.  Patient was trying on her shoes and trying to get one off when he she lost her balance and fell backwards.  Patient's back hit a cedar chest on the left side.  Patient had immediate pain that was worse with movement and breathing.  Pain does not radiate into the abdomen, neck or chest.  In the emergency department a CT showed multiple rib fractures on the left side of T7-10 with fractures of the transverse process in the T8 and 9.  Patient also on Coumadin for atrial fibrillation with an INR of 3.3.  There is a moderate amount of swelling on the left side of her back.    Today patient with worsening pain and nausea, has not been able to stay out of her pain to manage this for discharge home.  Patient with increased nausea, most likely related to increased pain.  Patient with 4 rib fractures and 2 transverse process fractures.  Patient required IV medications for symptom management.  Will admit to inpatient status and have physical therapy and Occupational Therapy reevaluate for possible TCU placement.  Patient lives with her significant other but he is unable to help her physically.        Closed fracture of transverse process of thoracic vertebra (H) - T8-T9    Closed fracture of multiple ribs of left side - T7-10  Assessment: Patient presented after a fall to the emergency department as above.  Patient with noted fractures on her ribs and transverse process of the T8 and 9.  Patient will be admitted to observation status for pain control and consult to surgery team for trauma. 11/25: Patient is feeling a little better, her pain is better. Patient up with assist of 1, unsteady on her feet. Patient did better with physical  therapy. INR elevated, no bleeding. Hematoma seems to be slightly larger on her left back. CXR done. Patient did have some abd pain, xray showed constipation. Hemoglobin dropped 1 gram but has been stable.  11/26: Patient with rib fractures and transverse process fractures as above.  Patient has been seen by physical therapy and was moving overall well but had episode of increased pain with nausea requiring IV medications and assistance of 2 for bed mobility.  Patient transition to inpatient status.  Patient not doing well with remembering on how to logroll and move per recommendations.  Plan:  Physical therapy ongoing for home safety and ambulation.  Occupational therapy evaluation for cognitive status and activity of daily living assessment.  Patient will likely need self-care help for discharge and possible TCU placement.  Pain management including Lidoderm patches, and IV Toradol scheduled.  IV Dilaudid as needed.  Will transition patient to oral medications when able.  Patient will likely need a scheduled dose of pain medication, possibly Ultram or Vicodin with ibuprofen if patient tolerates.  Started patient on PPI scheduled.  Incentive spirometry.  Orthotics consult for TLSO measurement.   aware and consult placed for possible discharge needs. IS every 2 hours while awake.       HTN, goal below 140/90  Assessment: Chronic and stable with current blood pressure high normal. Patient normally takes Norvasc 2.5 mg twice a day and Dyazide 37-25 daily.. 11/26: Patient stable with no acute concerns.  Plan: We will monitor blood pressure closely and continue home medications.  Elevation likely due to pain.      Long term current use of anticoagulant therapy    Chronic atrial fibrillation  Assessment: Patient with chronic atrial fibrillation currently on Coumadin with INR of 3.3. 11/26: Stable, INR stable with dosing per pharmacy recommendations.  Plan: We will continue Coumadin with recheck of INR  "tomorrow.  Will need to monitor patient's site of fall with rib fractures and possible hematoma.      Fall  Assessment: Patient presented after fall as above at home.  Patient tripped while trying to take off her shoe and fell backwards into a cedar chest. 11/26: No new concerns with plan as above  Plan: Plan as above.     Diet: Regular Diet Adult  Room Service  Diet    DVT Prophylaxis: Pneumatic Compression Devices  Hernandez Catheter: not present  Code Status: Full Code      Disposition Plan   Expected discharge: 1-2 days, recommended to TCU vs home once adequate pain management/ tolerating PO medications, hemoglobin stable and safe disposition plan/ TCU bed available.  Entered: Olga Aguilar CNP 11/26/2019, 12:23 PM       The patient's care was discussed with the Attending Physician, Dr. Zurita, Bedside Nurse, Care Coordinator/, Patient and Patient's Family.    Olga Aguilar CNP  Hospitalist Service  [facility]    ______________________________________________________________________    Interval History   Overnight patient was feeling slightly better, not requiring pain medications.  This morning patient was up with assist of 1 and a walker.  Patient developed significant pain and nausea after breakfast and was unable to reposition herself.  Patient required assist of 2 for bed mobility and standing.  Patient required IV medications including Dilaudid and Zofran for symptom management.  Discussed with patient to manage her pain we will need to schedule medications to stay on top of the pain as she is likely to continue to need pain medications for 4 to 6 weeks with 4 rib fractures and 2 thoracic fractures.  Patient in agreement although states \"I do not like to take pills\".  She has been hemodynamically stable and afebrile.  Hemoglobin has been stable, 11.1 today.  INR 3.6 and reduced dose of Coumadin today.  Patient remains constipated, feels like she needs " help bowel movement.  Patient is on a bowel management program.  Patient urinating within normal limits, no hemateria noted.  No other bleeding.  Patient does have a hematoma on her left chest/back from her fall which appears stable and may be slightly improved from yesterday. 10 point ROS neg other than the symptoms noted above       Data reviewed today: I reviewed all medications, new labs and imaging results over the last 24 hours.      Physical Exam   Vital Signs: Temp: 96.9  F (36.1  C) Temp src: Oral BP: (!) 153/73 Pulse: 70 Heart Rate: 71 Resp: 16 SpO2: 96 % O2 Device: None (Room air)    Weight: 145 lbs 3.2 oz  Constitutional: awake, alert, cooperative, mild distress due to pain   Eyes: Lids and lashes normal, pupils equal, round and reactive to light, extra ocular muscles intact, sclera clear, conjunctiva normal  Respiratory: No increased work of breathing, good air exchange, clear to auscultation bilaterally, no crackles or wheezing  Cardiovascular: Normal apical impulse, iregular rate and rhythm, atrial fibrillation  GI: Normal bowel sounds, soft, non-distended, non-tender. Patient states she has has blood per rectum chronically.    Skin: scattered bruising, no bleeding and normal skin color, texture, turgor  Musculoskeletal: There is no redness, warmth, or swelling of the joints.  Full range of motion noted. Swelling/hematoma to left back/ribs.   Neurologic: Awake, alert, oriented to name, place and time.  Cranial nerves II-XII are grossly intact.       Data   Recent Labs   Lab 11/26/19  0522 11/25/19  1404 11/25/19  0533 11/24/19  1406   WBC  --   --  6.9 11.6*   HGB 11.1* 10.5* 10.4* 11.6*   MCV  --   --  99 98   PLT  --   --  250 296   INR 3.65*  --  4.21* 3.30*   NA  --   --  143 142   POTASSIUM  --   --  3.4 3.0*   CHLORIDE  --   --  107 103   CO2  --   --  32 31   BUN  --   --  15 19   CR  --   --  0.93 0.94   ANIONGAP  --   --  4 8   JOSE CARLOS  --   --  8.7 9.4   GLC  --   --  91 86

## 2019-11-26 NOTE — PLAN OF CARE
Discharge Planner PT   Patient plan for discharge: Home with Mac  Current status: IND sit to/from stand from straight back chair. Ambulated 250 ft with supervision with 2WW, no LOB, expressed fatigue and mild nausea however upon talking in the hallway complaint went away. Verbal cues for sit to supine in bed with log roll method. IND scooting and bridging in bed.   Barriers to return to prior living situation: Medical status, pain control  Recommendations for discharge: Home with SO assistance 24/7, family transport  Rationale for recommendations: Patient able to complete functional mobility sufficient for management of her home environment without physical assistance and with support available in the home. She expressed increase pain, however this was discussed as it is expected. She was urged to continue increasing activity and ambulating to decrease pain and maintain blood flow. Patient demonstrates no further skilled inpatient PT needs.       Entered by: Symone Jiang 11/26/2019 10:00 AM        PM: per discussion with Olga Aguilar NP, patient made inpatient due to increased pain and decreased functional mobility tolerance. Patient to acquire TLSO. PT will continue to see patient in order to address mobility, TLSO wearing/donning and doffing and further assess discharge planning with device. PT will assess 11/27/19.     Thank you for your referral.    Symone Jiang, PT, DPT, ATC    [facility]    O: [phone]  E: [email or web address]

## 2019-11-27 ENCOUNTER — DOCUMENTATION ONLY (OUTPATIENT)
Dept: ORTHOPEDICS | Facility: CLINIC | Age: 84
End: 2019-11-27

## 2019-11-27 ENCOUNTER — APPOINTMENT (OUTPATIENT)
Dept: OCCUPATIONAL THERAPY | Facility: CLINIC | Age: 84
DRG: 184 | End: 2019-11-27
Attending: NURSE PRACTITIONER
Payer: COMMERCIAL

## 2019-11-27 ENCOUNTER — APPOINTMENT (OUTPATIENT)
Dept: PHYSICAL THERAPY | Facility: CLINIC | Age: 84
DRG: 184 | End: 2019-11-27
Payer: COMMERCIAL

## 2019-11-27 LAB
ANION GAP SERPL CALCULATED.3IONS-SCNC: 3 MMOL/L (ref 3–14)
BACTERIA SPEC CULT: NORMAL
BUN SERPL-MCNC: 19 MG/DL (ref 7–30)
CALCIUM SERPL-MCNC: 8.5 MG/DL (ref 8.5–10.1)
CHLORIDE SERPL-SCNC: 107 MMOL/L (ref 94–109)
CO2 SERPL-SCNC: 31 MMOL/L (ref 20–32)
CREAT SERPL-MCNC: 1.1 MG/DL (ref 0.52–1.04)
ERYTHROCYTE [DISTWIDTH] IN BLOOD BY AUTOMATED COUNT: 12.8 % (ref 10–15)
GFR SERPL CREATININE-BSD FRML MDRD: 46 ML/MIN/{1.73_M2}
GLUCOSE SERPL-MCNC: 98 MG/DL (ref 70–99)
HCT VFR BLD AUTO: 34.5 % (ref 35–47)
HGB BLD-MCNC: 11.3 G/DL (ref 11.7–15.7)
INR PPP: 1.94 (ref 0.86–1.14)
MCH RBC QN AUTO: 32.2 PG (ref 26.5–33)
MCHC RBC AUTO-ENTMCNC: 32.8 G/DL (ref 31.5–36.5)
MCV RBC AUTO: 98 FL (ref 78–100)
PLATELET # BLD AUTO: 268 10E9/L (ref 150–450)
POTASSIUM SERPL-SCNC: 3.4 MMOL/L (ref 3.4–5.3)
RBC # BLD AUTO: 3.51 10E12/L (ref 3.8–5.2)
SODIUM SERPL-SCNC: 141 MMOL/L (ref 133–144)
SPECIMEN SOURCE: NORMAL
WBC # BLD AUTO: 13.4 10E9/L (ref 4–11)

## 2019-11-27 PROCEDURE — 36415 COLL VENOUS BLD VENIPUNCTURE: CPT | Performed by: NURSE PRACTITIONER

## 2019-11-27 PROCEDURE — 99232 SBSQ HOSP IP/OBS MODERATE 35: CPT | Performed by: NURSE PRACTITIONER

## 2019-11-27 PROCEDURE — 25000132 ZZH RX MED GY IP 250 OP 250 PS 637: Performed by: SURGERY

## 2019-11-27 PROCEDURE — 97165 OT EVAL LOW COMPLEX 30 MIN: CPT | Mod: GO

## 2019-11-27 PROCEDURE — 25000128 H RX IP 250 OP 636: Performed by: HOSPITALIST

## 2019-11-27 PROCEDURE — 12000000 ZZH R&B MED SURG/OB

## 2019-11-27 PROCEDURE — L0486 TLSO RIGIDLINED CUST FAB TWO: HCPCS

## 2019-11-27 PROCEDURE — 25000132 ZZH RX MED GY IP 250 OP 250 PS 637: Performed by: NURSE PRACTITIONER

## 2019-11-27 PROCEDURE — 80048 BASIC METABOLIC PNL TOTAL CA: CPT | Performed by: NURSE PRACTITIONER

## 2019-11-27 PROCEDURE — 25000128 H RX IP 250 OP 636: Performed by: NURSE PRACTITIONER

## 2019-11-27 PROCEDURE — 99207 ZZC CDG-MDM COMPONENT: MEETS LOW - DOWN CODED: CPT | Performed by: NURSE PRACTITIONER

## 2019-11-27 PROCEDURE — 85027 COMPLETE CBC AUTOMATED: CPT | Performed by: NURSE PRACTITIONER

## 2019-11-27 PROCEDURE — 85610 PROTHROMBIN TIME: CPT | Performed by: NURSE PRACTITIONER

## 2019-11-27 PROCEDURE — 25000132 ZZH RX MED GY IP 250 OP 250 PS 637: Performed by: FAMILY MEDICINE

## 2019-11-27 PROCEDURE — 97530 THERAPEUTIC ACTIVITIES: CPT | Mod: GP

## 2019-11-27 RX ORDER — PROCHLORPERAZINE 25 MG
12.5 SUPPOSITORY, RECTAL RECTAL EVERY 12 HOURS PRN
Status: DISCONTINUED | OUTPATIENT
Start: 2019-11-27 | End: 2019-11-28 | Stop reason: HOSPADM

## 2019-11-27 RX ORDER — FAMOTIDINE 20 MG/1
20 TABLET, FILM COATED ORAL DAILY
Status: DISCONTINUED | OUTPATIENT
Start: 2019-11-28 | End: 2019-11-28 | Stop reason: HOSPADM

## 2019-11-27 RX ORDER — NAPROXEN 250 MG/1
250 TABLET ORAL 2 TIMES DAILY WITH MEALS
Status: DISCONTINUED | OUTPATIENT
Start: 2019-11-27 | End: 2019-11-28 | Stop reason: HOSPADM

## 2019-11-27 RX ORDER — HYDRALAZINE HYDROCHLORIDE 20 MG/ML
10 INJECTION INTRAMUSCULAR; INTRAVENOUS ONCE
Status: COMPLETED | OUTPATIENT
Start: 2019-11-27 | End: 2019-11-27

## 2019-11-27 RX ORDER — WARFARIN SODIUM 5 MG/1
10 TABLET ORAL
Status: COMPLETED | OUTPATIENT
Start: 2019-11-27 | End: 2019-11-27

## 2019-11-27 RX ORDER — PROCHLORPERAZINE MALEATE 5 MG
5 TABLET ORAL EVERY 6 HOURS PRN
Status: DISCONTINUED | OUTPATIENT
Start: 2019-11-27 | End: 2019-11-28 | Stop reason: HOSPADM

## 2019-11-27 RX ORDER — MECLIZINE HCL 12.5 MG 12.5 MG/1
25 TABLET ORAL 3 TIMES DAILY
Status: DISCONTINUED | OUTPATIENT
Start: 2019-11-27 | End: 2019-11-28 | Stop reason: HOSPADM

## 2019-11-27 RX ADMIN — FAMOTIDINE 20 MG: 20 TABLET, FILM COATED ORAL at 09:01

## 2019-11-27 RX ADMIN — WARFARIN SODIUM 10 MG: 5 TABLET ORAL at 18:00

## 2019-11-27 RX ADMIN — MECLIZINE HYDROCHLORIDE 25 MG: 12.5 TABLET, FILM COATED ORAL at 21:12

## 2019-11-27 RX ADMIN — SENNOSIDES AND DOCUSATE SODIUM 2 TABLET: 8.6; 5 TABLET ORAL at 09:01

## 2019-11-27 RX ADMIN — ONDANSETRON 4 MG: 2 INJECTION INTRAMUSCULAR; INTRAVENOUS at 15:42

## 2019-11-27 RX ADMIN — METFORMIN HYDROCHLORIDE 500 MG: 500 TABLET ORAL at 09:02

## 2019-11-27 RX ADMIN — KETOROLAC TROMETHAMINE 15 MG: 15 INJECTION, SOLUTION INTRAMUSCULAR; INTRAVENOUS at 11:22

## 2019-11-27 RX ADMIN — HYDRALAZINE HYDROCHLORIDE 10 MG: 20 INJECTION INTRAMUSCULAR; INTRAVENOUS at 04:32

## 2019-11-27 RX ADMIN — AMLODIPINE BESYLATE 2.5 MG: 2.5 TABLET ORAL at 21:11

## 2019-11-27 RX ADMIN — HYDROMORPHONE HYDROCHLORIDE 2 MG: 2 TABLET ORAL at 21:17

## 2019-11-27 RX ADMIN — ONDANSETRON 4 MG: 2 INJECTION INTRAMUSCULAR; INTRAVENOUS at 03:57

## 2019-11-27 RX ADMIN — MECLIZINE HYDROCHLORIDE 25 MG: 12.5 TABLET, FILM COATED ORAL at 04:30

## 2019-11-27 RX ADMIN — POLYETHYLENE GLYCOL 3350 17 G: 17 POWDER, FOR SOLUTION ORAL at 09:00

## 2019-11-27 RX ADMIN — AMLODIPINE BESYLATE 5 MG: 5 TABLET ORAL at 09:01

## 2019-11-27 RX ADMIN — LEVOTHYROXINE SODIUM 150 MCG: 75 TABLET ORAL at 06:38

## 2019-11-27 RX ADMIN — HYDROMORPHONE HYDROCHLORIDE 2 MG: 2 TABLET ORAL at 13:29

## 2019-11-27 RX ADMIN — KETOROLAC TROMETHAMINE 15 MG: 15 INJECTION, SOLUTION INTRAMUSCULAR; INTRAVENOUS at 04:25

## 2019-11-27 RX ADMIN — TRIAMTERENE AND HYDROCHLOROTHIAZIDE 1 TABLET: 37.5; 25 TABLET ORAL at 09:02

## 2019-11-27 RX ADMIN — SENNOSIDES AND DOCUSATE SODIUM 2 TABLET: 8.6; 5 TABLET ORAL at 21:13

## 2019-11-27 RX ADMIN — KETOROLAC TROMETHAMINE 15 MG: 15 INJECTION, SOLUTION INTRAMUSCULAR; INTRAVENOUS at 17:16

## 2019-11-27 RX ADMIN — ACETAMINOPHEN 975 MG: 325 TABLET, FILM COATED ORAL at 21:10

## 2019-11-27 RX ADMIN — PROCHLORPERAZINE EDISYLATE 5 MG: 5 INJECTION INTRAMUSCULAR; INTRAVENOUS at 18:05

## 2019-11-27 RX ADMIN — HYDROMORPHONE HYDROCHLORIDE 2 MG: 2 TABLET ORAL at 09:01

## 2019-11-27 RX ADMIN — MECLIZINE HYDROCHLORIDE 25 MG: 12.5 TABLET, FILM COATED ORAL at 13:29

## 2019-11-27 RX ADMIN — ACETAMINOPHEN 975 MG: 325 TABLET, FILM COATED ORAL at 13:29

## 2019-11-27 ASSESSMENT — ACTIVITIES OF DAILY LIVING (ADL)
ADLS_ACUITY_SCORE: 16
ADLS_ACUITY_SCORE: 18
ADLS_ACUITY_SCORE: 16
ADLS_ACUITY_SCORE: 16
PREVIOUS_RESPONSIBILITIES: MEAL PREP;HOUSEKEEPING;LAUNDRY;SHOPPING;MEDICATION MANAGEMENT;FINANCES
ADLS_ACUITY_SCORE: 16
ADLS_ACUITY_SCORE: 18

## 2019-11-27 NOTE — CONSULTS
Care Transition Initial Assessment - RN      Met with: Patient and significant other, Mac.    DATA  Principal Problem:    Closed fracture of multiple ribs of left side - T7-10  Active Problems:    HTN, goal below 140/90    Chronic atrial fibrillation    Fall    Closed fracture of transverse process of thoracic vertebra (H) - T8-T9    Long term current use of anticoagulant therapy    Rib fractures       Cognitive Status: awake, alert and oriented.  [facility]  Primary Care MD Name: Anthony  Contact information and PCP information verified: Yes  Lives With: significant other   Living Arrangements: house  Quality of Family Relationships: involved, supportive     Insurance concerns: No Insurance issues identified    This writer met with pt and significant other Mac, introduced self and role. Patient shares a home with her significant other. She is able to live on one level with the exception of laundry. Discussed discharge planning and Medicare guidelines in regards to home care, TCU and LTC.  Discussed with patient PT recommendations for home PT.   Pt was provided with Medicare certified home care list. Pt chooses not to move forward with home care PT as she does not wish to have someone come into her home.  Patient was provided with Medicare certified nursing home list. Pts choices are as follows 1. [facility] (Admissions: [phone] Main Phone: [phone] Fax: [phone]), and 2. [facility] (Admissions phone: [phone] Main Phone: [phone] Fax: [phone]).  Discussed with patient that TCU's will assess her to see if she meets criteria for TCU placement.  Provided information on self-pay options related to TCU placement should patient not meet Medicare Guidelines.    PLAN    TCU referrals pending.  Patient states that if she doesn't meet criteria for TCU placement, she plans on discharging home without home care PT.  Care  transitions to follow through discharge.    Dominga Chang, DNP, RN, CNE, PHN  RN Care Coordinator  [facility] [phone]  [facility] [phone]

## 2019-11-27 NOTE — PROGRESS NOTES
"S: I saw Estela \"Marizol\" Deborah at room 254 at the [facility] in [place] for fitting of a custom from measurements thoracolumbosacral orthosis (TLSO) per Rx from Olga Aguilar CNP. Marizol had sustained fractures of the transverse processes of T8 and T9 and multiple rib fractures on her left side.   O/g: Custom TLSO is to provide support and stabilization.  A: I have fit a custom TLSO as measured for by KORTNEY Lee LO and fabricated by [employer]. After initial donning of the brace Marizol reported complaint of pain along the left side at the rib fractures. I adjusted the TLSO by grinding away material along the left side at the padded waist groove and above that area as well and heating and pushing out the plastic in this area to further reduce pressures. I removed approximately 1.25\" of padded liner under the axillae to reduce discomfort in this area. I provided written and verbal instruction on use and care of the brace.  P: Follow PRN.    "

## 2019-11-27 NOTE — PROGRESS NOTES
[facility]    Medicine Progress Note - Hospitalist Service       Date of Admission:  11/24/2019  Assessment & Plan   Estela Cabrera is a 84 year old female admitted on 11/24/2019. She presented to the emergency department complaining of back pain after a fall.  Patient was trying on her shoes and trying to get one off when he she lost her balance and fell backwards.  Patient's back hit a cedar chest on the left side.  Patient had immediate pain that was worse with movement and breathing.  Pain does not radiate into the abdomen, neck or chest.  In the emergency department a CT showed multiple rib fractures on the left side of T7-10 with fractures of the transverse process in the T8 and 9.  Patient also on Coumadin for atrial fibrillation with an INR of 3.3.  There is a moderate amount of swelling on the left side of her back.  Today patient with worsening pain and nausea, has not been able to stay out of her pain to manage this for discharge home.  Patient with increased nausea, most likely related to increased pain.  Patient with 4 rib fractures and 2 transverse process fractures.  Patient required IV medications for symptom management.  Will admit to inpatient status and have physical therapy and Occupational Therapy reevaluate for possible TCU placement.  Patient lives with her significant other but he is unable to help her physically.        Closed fracture of transverse process of thoracic vertebra (H) - T8-T9    Closed fracture of multiple ribs of left side - T7-10  Assessment: Patient presented after a fall to the emergency department as above.  Patient with noted fractures on her ribs and transverse process of the T8 and 9.  Patient will be admitted to observation status for pain control and consult to surgery team for trauma. 11/25: Patient is feeling a little better, her pain is better. Patient up with assist of 1, unsteady on her feet. Patient did better with physical  therapy. INR elevated, no bleeding. Hematoma seems to be slightly larger on her left back. CXR done. Patient did have some abd pain, xray showed constipation. Hemoglobin dropped 1 gram but has been stable.  11/26: Patient with rib fractures and transverse process fractures as above.  Patient has been seen by physical therapy and was moving overall well but had episode of increased pain with nausea requiring IV medications and assistance of 2 for bed mobility.  Patient transition to inpatient status.  Patient not doing well with remembering on how to logroll and move per recommendations. 11/27-Patient notes ongoing improvement in pain. Continues to note pain to left flank and back with movement but believes pain is well managed with current regimen. Denies shortness of breath and patient is maintaining oxygen saturations above 90% on room air. Notes some nausea but has not vomited. Has been tolerating oral intake. Patient notes she was able to have a bowel movement yesterday. Fitted with TLSO brace today and patient notes this made pain worse and is very uncomfortable. Patient is afebrile and hemodynamically stable.   Plan: Will stop IV Toradol and transition to oral Naproxen 250 mg twice daily. Will continue PT and OT evaluation.  Will continue pain management including Lidoderm patches and scheduled oral dilaudid. IS every 2 hours while awake. TLSO was ordered for comfort only and patient can choose not to wear this.       HTN, goal below 140/90  Assessment: Chronic and stable with current blood pressure high normal. Patient normally takes Norvasc 2.5 mg twice a day and Dyazide 37-25 daily.. 11/27: Patient stable with no acute concerns.  Plan: We will monitor blood pressure closely and continue home medications.  Elevation likely due to pain.      Long term current use of anticoagulant therapy    Chronic atrial fibrillation  Assessment: Patient with chronic atrial fibrillation currently on Coumadin with INR of 3.3.  11/27: Stable, INR stable with dosing per pharmacy recommendations.  Plan: We will continue Coumadin with recheck of INR tomorrow.  Will need to monitor patient's site of fall with rib fractures and possible hematoma.      Fall  Assessment: Patient presented after fall as above at home.  Patient tripped while trying to take off her shoe and fell backwards into a cedar chest. 11/27: No new concerns with plan as above  Plan: Plan as above.     Diet: Regular Diet Adult  Room Service  Diet    DVT Prophylaxis: Pneumatic Compression Devices  Hrenandez Catheter: not present  Code Status: Full Code      Disposition Plan   Expected discharge: Tomorrow, recommended to prior living arrangement once adequate pain management/ tolerating PO medications and safe disposition plan/ TCU bed available.  Entered: Joseph Hester NP 11/27/2019, 5:53 PM       The patient's care was discussed with the Attending Physician, Dr. Zurita and Patient.    Joseph Hester NP  Hospitalist Service  [facility]    ______________________________________________________________________    Interval History   Patient seen sitting up in bed noting ongoing improvement in pain levels. Continues to note pain to left flank and back with movement but believes pain is well managed with current regimen. Denies shortness of breath and patient is maintaining oxygen saturations above 90% on room air. Notes some nausea but has not vomited. Has been tolerating oral intake. Patient notes she was able to have a bowel movement yesterday. Fitted with TLSO brace today and patient notes this made pain worse and is very uncomfortable. Patient is afebrile and hemodynamically stable.     Data reviewed today: I reviewed all medications, new labs and imaging results over the last 24 hours.    Physical Exam   Vital Signs: Temp: 96.5  F (35.8  C) Temp src: Oral BP: (!) 172/80 Pulse: 68 Heart Rate: 71 Resp: 16 SpO2: 96 % O2 Device: None (Room air)     Weight: 145 lbs 3.2 oz  Constitutional: awake, alert, cooperative, no apparent distress, and appears stated age  Respiratory: No increased work of breathing, good air exchange, clear to auscultation bilaterally, no crackles or wheezing  Cardiovascular: regular rate and rhythm, normal S1 and S2 and no murmur noted  GI: normal bowel sounds, non-distended and non-tender  Skin: no bruising or bleeding and normal skin color, texture, turgor  Musculoskeletal: no lower extremity pitting edema present; there is no redness, warmth, or swelling of the joints  Neurologic: Awake, alert, oriented to name, place and time.      Data   Recent Labs   Lab 11/27/19  0610 11/26/19  0522 11/25/19  1404 11/25/19  0533 11/24/19  1406   WBC 13.4*  --   --  6.9 11.6*   HGB 11.3* 11.1* 10.5* 10.4* 11.6*   MCV 98  --   --  99 98     --   --  250 296   INR 1.94* 3.65*  --  4.21* 3.30*     --   --  143 142   POTASSIUM 3.4  --   --  3.4 3.0*   CHLORIDE 107  --   --  107 103   CO2 31  --   --  32 31   BUN 19  --   --  15 19   CR 1.10*  --   --  0.93 0.94   ANIONGAP 3  --   --  4 8   JOSE CARLOS 8.5  --   --  8.7 9.4   GLC 98  --   --  91 86     Medications     Warfarin Therapy Reminder         acetaminophen  975 mg Oral Q8H     amLODIPine  2.5 mg Oral QPM     amLODIPine  5 mg Oral QAM     [START ON 11/28/2019] famotidine  20 mg Oral Daily     HYDROmorphone  2 mg Oral TID     levothyroxine  150 mcg Oral QAM AC     lidocaine   Transdermal Once     meclizine  25 mg Oral TID     metFORMIN  500 mg Oral Daily with breakfast     naproxen  250 mg Oral BID w/meals     polyethylene glycol  17 g Oral Daily     senna-docusate  1 tablet Oral BID    Or     senna-docusate  2 tablet Oral BID     triamterene-HCTZ  1 tablet Oral Daily

## 2019-11-27 NOTE — PHARMACY-ANTICOAGULATION SERVICE
Clinical Pharmacy - Warfarin Dosing Consult     Pharmacy has been consulted to manage this patient s warfarin therapy.  Indication: Atrial Fibrillation  Therapy Goal: INR 2-3  Warfarin Prior to Admission: Yes  Warfarin PTA Regimen: 10 mg on Sunday, 7.5 mg all other days    INR   Date Value Ref Range Status   11/27/2019 1.94 (H) 0.86 - 1.14 Final   11/26/2019 3.65 (H) 0.86 - 1.14 Final       Recommend warfarin 10 mg today.  Doses held 11/24 and 11/25, 2.5 mg dose 11/26. Pharmacy will monitor Estela JUAN JOSÉ Cabrera daily and order warfarin doses to achieve specified goal.      Please contact pharmacy as soon as possible if the warfarin needs to be held for a procedure or if the warfarin goals change.

## 2019-11-27 NOTE — PLAN OF CARE
"Discharge Planner PT   Patient plan for discharge: Home with Significant Other (Mac)  Current status: IND with bed mobility and log rolling while following spinal precautions. IND with all transfers. Independent with ambulation greater than 250 feet without an assistive device and with CGA for safety only. Patient ascended and descended 12 steps independently with bilateral rails and CGA for safety only. Patient reported left lateral/posterior rib pain of with ambulation, with stair ambulation and with transfers. Patient experienced no shortness of breath however reported complaints of nausea following therapy. Patient consistently reported throughout therapy that she doesn't like the TLSO due to discomfort, increased pain in her ribs, increased nausea and feelings of \"claustrophobia\".  Barriers to return to prior living situation: Difficulties with nausea, Pain in ribs, Medical Status  Recommendations for discharge: Discharge home with assistance 24/7 from significant other, no TLSO, family transport  Rationale for recommendations: Based on activity tolerance, functional level, pain level, medical status and additional symptoms with activity.       Entered by: Clayton Huitron 11/27/2019 3:38 PM       "

## 2019-11-27 NOTE — PLAN OF CARE
Discharge Planner OT   Patient plan for discharge: Home with Mac (boyfriend)  Current status:  Estela Cabrera is a 84 year old female admitted on 11/24/2019. She presented to the ED complaining of back pain after a fall.  Patient was trying on her shoes and trying to get one off when he she lost her balance and fell backwards.  Patient's back hit a cedar chest on the left side. 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  Patient lives at home with her boyfriend Javier who is home for 24/7 assist.  Patient previously IND with dressing, bathing, medication management, housekeeping, shopping, and laundry.  Patient currently requires assistance for UB dressing and has not attempted to don bra.  Patient has walk in shower with grab bars; no hx of falls in shower.  Arthritis in hands impact opening jars but she has learned to compensate.  Patient does not drive. Cognitive screen completed on this date.  Patient scored mild cognitive impairment on the SLUMS 24/30 with difficulty with working memory.  Patient reported difficulties with money and numbers at baseline which may be impacted by her 3 strokes and/or concussion.  Patient oriented x4 and aware of cognitive deficits. Patient reported being organized with several routines which assist with cognitive deficits such as bill paying and medication management.  Patient proud that she has never smoked or drank a day in her life.  Patient reported enjoying going out to eat with Mac, visiting family, going to the movies, and loves horses. She no longer rides horses but she used to.   Barriers to return to prior living situation: mild cognitive impairment, medical status, fall status.   Recommendations for discharge: Home with Mac assist.   Rationale for recommendations: Patient has adequate level of  support from family.  Patient would benefit from 1-2 more OT sessions for education on dressing for increased independence.          Entered by: Britany Luis 11/27/2019 11:08 AM

## 2019-11-27 NOTE — PROGRESS NOTES
Initial Inpatient Occupational Therapy Evaluation     11/27/19 1031   Quick Adds   Type of Visit Initial Occupational Therapy Evaluation   Living Environment   Lives With significant other   Living Arrangements house   Home Accessibility stairs to enter home;stairs within home   Transportation Anticipated family or friend will provide   Living Environment Comment Mac present 24/7; can assist as needed.  Can stay on 1 level of home with bedroom and bathroom.    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 hx of migranes   Functional Level   Ambulation 0-->independent   Transferring 0-->independent   Toileting 0-->independent   Bathing 0-->independent   Dressing 2-->assistive person   Eating 0-->independent   Communication 0-->understands/communicates without difficulty   Swallowing 0-->swallows foods/liquids without difficulty   Cognition 1 - attention or memory deficits   Fall history within last six months yes   Number of times patient has fallen within last six months 3   Which of the above functional risks had a recent onset or change? fall history   Prior Functional Level Comment Previously IND with self cares, however, after fall pt required min assist to don shirt and denied donning bra.    General Information   Onset of Illness/Injury or Date of Surgery - Date 11/26/19   Referring Physician Olga Aguilar, CNP   Patient/Family Goals Statement go home with Mac assist   Additional Occupational Profile Info/Pertinent History of Current Problem Estela Cabrera is a 84 year old female admitted on 11/24/2019. She presented to the ED complaining of back pain after a fall.  Patient was trying on her shoes and trying to get one off when he she lost her balance and fell  backwards.  Patient's back hit a cedar chest on the left side. 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  Patient lives at home with her boyfriend Javier who is home for 24/7 assist.  Patient previously IND with dressing, bathing, medication management, housekeeping, shopping, and laundry.  Patient currently requires assistance for UB dressing and has not attempted to don bra.  Patient has walk in shower with grab bars; no hx of falls in shower.  Arthritis in hands impact opening jars but she has learned to compensate.  Patient does not drive.  Patient reported difficulties with money and numbers at baseline which may be impacted by her 3 strokes and/or concussion.  Patient oriented x4 and aware of cognitive deficits. Patient reported being organized with several routines which assist with cognitive deficits such as bill paying and medication management.  Patient proud that she has never smoked or drank a day in her life.  Patient reported enjoying going out to eat with Javier, visiting family, going to the movies, and loves horses. She no longer rides horses but she used to.   Precautions/Limitations fall precautions   Cognitive Status Examination   Orientation orientation to person, place and time   Level of Consciousness alert   Follows Commands (Cognition) WNL   Memory impaired   Attention No deficits were identified   Organization/Problem Solving No deficits were identified   Executive Function Working memory impaired, decreased storage of information for performing tasks   Cognitive Comment Prior to cognitive screen pt reported having word finding difficulties and she has difficulties managing money/numbers.  SLUMS 24/30 with difficulty with working memory.    Visual Perception   Visual Perception Wears glasses   Range of Motion (ROM)  "  ROM Comment left shoulder ROM difficulties due to pain.  Asess further tomorrow due to pain.    Strength   Strength Comments Pt stated no deficits with strength, however, bilateral hand arthritis.  Strength not assessed on this date.    Instrumental Activities of Daily Living (IADL)   Previous Responsibilities meal prep;housekeeping;laundry;shopping;medication management;finances   IADL Comments Max does finances   Activities of Daily Living Analysis   Impairments Contributing to Impaired Activities of Daily Living pain;ROM decreased   ADL Comments UB dressing reported being difficult.    General Therapy Interventions   Planned Therapy Interventions ADL retraining   Intervention Comments AE training/education   Clinical Impression   Criteria for Skilled Therapeutic Interventions Met yes, treatment indicated   OT Diagnosis decreased independence with BADLs/IADLs   Influenced by the following impairments pain   Assessment of Occupational Performance 1-3 Performance Deficits   Identified Performance Deficits dressing, bathing, community mobility, leisure, safety, current medical status   Clinical Decision Making (Complexity) Low complexity   Therapy Frequency Daily   Predicted Duration of Therapy Intervention (days/wks) 1-2    Anticipated Discharge Disposition Home with Assist   Risks and Benefits of Treatment have been explained. Yes   Patient, Family & other staff in agreement with plan of care Yes   [facility] \"6 Clicks\"   2016, Trustees of [facility], under license to [employer].  All rights reserved.   6 Clicks Short Forms Daily Activity Inpatient Short Form   [facility]  \"6 Clicks\" Daily Activity Inpatient Short Form   1. Putting on and taking off regular lower body clothing? 2 - A Lot   2. Bathing (including washing, rinsing, drying)? 2 - A Lot   3. Toileting, which includes using toilet, bedpan or urinal? 3 - A Little   4. Putting on and taking off regular upper body " clothing? 2 - A Lot   5. Taking care of personal grooming such as brushing teeth? 3 - A Little   6. Eating meals? 4 - None   Daily Activity Raw Score (Score out of 24.Lower scores equate to lower levels of function) 16   Total Evaluation Time   Total Evaluation Time (Minutes) 35     Thank you for your referral.     VARGHESE Mcgraw [facility]  Occupational Therapy     Phone: [phone]  Email: [email or web address]

## 2019-11-27 NOTE — PLAN OF CARE
Patient is alert and oriented x4. Patient reports pain in left ribs, upper abdomen (from chronic ulcers), and back. Patient had TLSO brace fitted, adjusted, and applied. Patient reports lots of discomfort with this brace, even after adjustments were made. Patient agreed to wear it for a while to see if she adjusts. Patient is receiving scheduled toradol, tylenol, and PO dilaudid, which is effective for pain control. Vital signs are stable, afebrile. Will continue to monitor.

## 2019-11-27 NOTE — PLAN OF CARE
"S-(situation): End of shift note    B-(background): Fall with multiple rib and back fractures    A-(assessment): Pt hypertensive, MD notified,  one time order of hydralazine ordered and given.  BP (!) (P) 145/81 (BP Location: Left arm)   Pulse 71   Temp 95.4  F (35.2  C) (Oral)   Resp 16   Ht 1.676 m (5' 6\")   Wt 65.9 kg (145 lb 3.2 oz)   SpO2 97%   BMI 23.44 kg/m  .  Afebrile. Lung sounds CTA B/L. Pain controlled with scheduled oral pain medication per MAR. Pt states \"I feel so much better than this morning\".  IS in use. On RA.  A&O x 4. Ambulating with assist of 1, and walker.  Voiding adequately.  SCDs in use.  IV site asymptomatic. Nausea this morning, Zofran and meclozine given.  Able to make needs known and uses call light appropriately.     R-(recommendations): Continue to monitor per care plan.    "

## 2019-11-28 ENCOUNTER — APPOINTMENT (OUTPATIENT)
Dept: OCCUPATIONAL THERAPY | Facility: CLINIC | Age: 84
DRG: 184 | End: 2019-11-28
Payer: COMMERCIAL

## 2019-11-28 VITALS
RESPIRATION RATE: 16 BRPM | BODY MASS INDEX: 23.33 KG/M2 | HEART RATE: 66 BPM | OXYGEN SATURATION: 99 % | WEIGHT: 145.2 LBS | SYSTOLIC BLOOD PRESSURE: 144 MMHG | TEMPERATURE: 97 F | HEIGHT: 66 IN | DIASTOLIC BLOOD PRESSURE: 60 MMHG

## 2019-11-28 LAB
ANION GAP SERPL CALCULATED.3IONS-SCNC: 5 MMOL/L (ref 3–14)
BUN SERPL-MCNC: 18 MG/DL (ref 7–30)
CALCIUM SERPL-MCNC: 8.4 MG/DL (ref 8.5–10.1)
CHLORIDE SERPL-SCNC: 106 MMOL/L (ref 94–109)
CO2 SERPL-SCNC: 30 MMOL/L (ref 20–32)
CREAT SERPL-MCNC: 1.17 MG/DL (ref 0.52–1.04)
ERYTHROCYTE [DISTWIDTH] IN BLOOD BY AUTOMATED COUNT: 12.8 % (ref 10–15)
GFR SERPL CREATININE-BSD FRML MDRD: 43 ML/MIN/{1.73_M2}
GLUCOSE SERPL-MCNC: 82 MG/DL (ref 70–99)
HCT VFR BLD AUTO: 32.9 % (ref 35–47)
HGB BLD-MCNC: 10.7 G/DL (ref 11.7–15.7)
INR PPP: 1.86 (ref 0.86–1.14)
MCH RBC QN AUTO: 32.3 PG (ref 26.5–33)
MCHC RBC AUTO-ENTMCNC: 32.5 G/DL (ref 31.5–36.5)
MCV RBC AUTO: 99 FL (ref 78–100)
PLATELET # BLD AUTO: 253 10E9/L (ref 150–450)
POTASSIUM SERPL-SCNC: 3.6 MMOL/L (ref 3.4–5.3)
RBC # BLD AUTO: 3.31 10E12/L (ref 3.8–5.2)
SODIUM SERPL-SCNC: 141 MMOL/L (ref 133–144)
WBC # BLD AUTO: 6.9 10E9/L (ref 4–11)

## 2019-11-28 PROCEDURE — 97535 SELF CARE MNGMENT TRAINING: CPT | Mod: GO

## 2019-11-28 PROCEDURE — 85027 COMPLETE CBC AUTOMATED: CPT | Performed by: NURSE PRACTITIONER

## 2019-11-28 PROCEDURE — 80048 BASIC METABOLIC PNL TOTAL CA: CPT | Performed by: NURSE PRACTITIONER

## 2019-11-28 PROCEDURE — 85610 PROTHROMBIN TIME: CPT | Performed by: NURSE PRACTITIONER

## 2019-11-28 PROCEDURE — 25000132 ZZH RX MED GY IP 250 OP 250 PS 637: Performed by: SURGERY

## 2019-11-28 PROCEDURE — 25000132 ZZH RX MED GY IP 250 OP 250 PS 637: Performed by: FAMILY MEDICINE

## 2019-11-28 PROCEDURE — 25000132 ZZH RX MED GY IP 250 OP 250 PS 637: Performed by: NURSE PRACTITIONER

## 2019-11-28 PROCEDURE — 36415 COLL VENOUS BLD VENIPUNCTURE: CPT | Performed by: NURSE PRACTITIONER

## 2019-11-28 PROCEDURE — 99239 HOSP IP/OBS DSCHRG MGMT >30: CPT | Performed by: NURSE PRACTITIONER

## 2019-11-28 RX ORDER — WARFARIN SODIUM 2.5 MG/1
7.5 TABLET ORAL DAILY
COMMUNITY
Start: 2019-11-28

## 2019-11-28 RX ORDER — ACETAMINOPHEN 325 MG/1
975 TABLET ORAL EVERY 8 HOURS
COMMUNITY
Start: 2019-11-28

## 2019-11-28 RX ORDER — HYDROMORPHONE HYDROCHLORIDE 2 MG/1
2 TABLET ORAL 3 TIMES DAILY PRN
Qty: 21 TABLET | Refills: 0 | Status: SHIPPED | OUTPATIENT
Start: 2019-11-28

## 2019-11-28 RX ADMIN — METFORMIN HYDROCHLORIDE 500 MG: 500 TABLET ORAL at 08:04

## 2019-11-28 RX ADMIN — AMLODIPINE BESYLATE 5 MG: 5 TABLET ORAL at 08:04

## 2019-11-28 RX ADMIN — LEVOTHYROXINE SODIUM 150 MCG: 75 TABLET ORAL at 06:25

## 2019-11-28 RX ADMIN — POLYETHYLENE GLYCOL 3350 17 G: 17 POWDER, FOR SOLUTION ORAL at 08:05

## 2019-11-28 RX ADMIN — FAMOTIDINE 20 MG: 20 TABLET, FILM COATED ORAL at 08:06

## 2019-11-28 RX ADMIN — NAPROXEN 250 MG: 250 TABLET ORAL at 08:04

## 2019-11-28 RX ADMIN — HYDROMORPHONE HYDROCHLORIDE 2 MG: 2 TABLET ORAL at 08:04

## 2019-11-28 RX ADMIN — SENNOSIDES AND DOCUSATE SODIUM 1 TABLET: 8.6; 5 TABLET ORAL at 08:04

## 2019-11-28 RX ADMIN — ACETAMINOPHEN 975 MG: 325 TABLET, FILM COATED ORAL at 06:25

## 2019-11-28 RX ADMIN — TRIAMTERENE AND HYDROCHLOROTHIAZIDE 1 TABLET: 37.5; 25 TABLET ORAL at 08:04

## 2019-11-28 RX ADMIN — MECLIZINE HYDROCHLORIDE 25 MG: 12.5 TABLET, FILM COATED ORAL at 08:05

## 2019-11-28 ASSESSMENT — PAIN DESCRIPTION - DESCRIPTORS: DESCRIPTORS: SHARP;STABBING

## 2019-11-28 ASSESSMENT — ACTIVITIES OF DAILY LIVING (ADL)
ADLS_ACUITY_SCORE: 18
ADLS_ACUITY_SCORE: 17

## 2019-11-28 NOTE — PROGRESS NOTES
S-(situation): Patient discharged to home via car with significant other, Mac    B-(background): Rib fractures    A-(assessment): Patient is alert and oriented x4. Patient reports pain in left side and back/hip area. Patient reports dilaudid, naproxcyn, and tylenol are adequate for pain control. Vital signs are stable, afebrile.    R-(recommendations): Discharge instructions reviewed with patient. Listed belongings gathered and returned to patient.          Discharge Nursing Criteria:     Care Plan and Patient education resolved: Yes    New Medications- pt has been educated about purpose and side effects: Yes    Vaccines  Influenza status verified at discharge:  Yes    MISC  Prescriptions if needed, hard copies sent with patient  Yes  Home and hospital aquired medications returned to patient: NA  Medication Bin checked and emptied on discharge Yes  Patient reports post-discharge pain management plan is effective: Yes

## 2019-11-28 NOTE — PLAN OF CARE
Discharge Planner OT   Patient plan for discharge: Home with assistance from Mac  Current status: Pt seated upright in bed with HOB elevated.  Pt and OT discussed precautions and safety concerns about going home vs. TCU.  Patient IND with bed mobility and transfers from sit to stand, ambulating bed <-> toilet, and toilet transfer with clothing and pericare management with supervision for safety using 2WW. OT provided handout, education, demonstration, and completion of dressing (UB and LB including socks) using AE and precautions with MOD IND.  Educated patient on inserting left UE first due to rib pain.  Pt displayed good dynamic balance while washing her hands and good static balance of 5 min to tell story.  Pt reported minor pain with initial transfer from bed to stand. Pt has no further concerns at this time. Pt seated up in bed with bed alarm for safety.   Barriers to return to prior living situation: pain management, medical status, precautions (no lift, no pull, no bending, no twisting)  Recommendations for discharge: Home with assistance   Rationale for recommendations: Pt has adequate level of support from family and all recommended AE to facilitate safety within pt's home environment during ADL and IADL tasks. No further inpatient OT needs identified.      Entered by: Britany Luis 11/28/2019 10:07 AM      Occupational Therapy Discharge Summary    Reason for therapy discharge:    All goals and outcomes met, no further needs identified.    Progress towards therapy goal(s). See goals on Care Plan in [facility] electronic health record for goal details.  Goals met    Therapy recommendation(s):    No further therapy is recommended.     Thank you for your referral.     VARGHESE Mcgraw  [facility]  Occupational Therapy     Phone: [phone]  Email: [email or web address]

## 2019-11-28 NOTE — PHARMACY-ANTICOAGULATION SERVICE
Clinical Pharmacy - Warfarin Dosing Consult     Pharmacy has been consulted to manage this patient s warfarin therapy.  Indication: Atrial Fibrillation  Therapy Goal: INR 2-3  Warfarin Prior to Admission: Yes  Warfarin PTA Regimen: 10 mg on Sunday, 7.5 mg all other days    INR   Date Value Ref Range Status   11/28/2019 1.86 (H) 0.86 - 1.14 Final   11/27/2019 1.94 (H) 0.86 - 1.14 Final       Recommend warfarin 7.5 mg today.  Pharmacy will monitor Estela Cabrera daily and order warfarin doses to achieve specified goal.      Please contact pharmacy as soon as possible if the warfarin needs to be held for a procedure or if the warfarin goals change.

## 2019-11-28 NOTE — PLAN OF CARE
S-(situation): shift note    B-(background): Pt is A&O.  VSS.  Afebrile.  Has not been medicated for her pain this shift.  Slept well most of night.  Up to the BR x1.  Walked with walker - no complaints of pain.  Refuses to wear her brace and her SCD'S.  States very uncomfortable.  CMS intact.      A-(assessment): See above - Fell at home - FX ribs     R-(recommendations): Will cont to monitor her pain.

## 2019-11-28 NOTE — DISCHARGE SUMMARY
[facility]  Hospitalist Discharge Summary       Date of Admission:  11/24/2019  Date of Discharge:  11/28/2019  Discharging Provider: Joseph Hester NP      Discharge Diagnoses   Principal Problem:    Closed fracture of multiple ribs of left side - T7-10  Active Problems:    HTN, goal below 140/90    Chronic atrial fibrillation    Fall    Closed fracture of transverse process of thoracic vertebra (H) - T8-T9    Long term current use of anticoagulant therapy    Rib fractures      Follow-ups Needed After Discharge   Follow-up Appointments     Follow-up and recommended labs and tests       Follow up with primary care provider, ERIC AMIN, within 7-14 days for   hospital follow- up.  The following labs/tests are recommended: INR.      Follow up with INR clinic for INR check on 12/2/19 or 12/3/19.           Unresulted Labs Ordered in the Past 30 Days of this Admission     No orders found from 10/25/2019 to 11/25/2019.      These results will be followed up by n/a    Discharge Disposition   Discharged to home  Condition at discharge: Stable    Hospital Course   Estela Cabrera is a 84 year old female admitted on 11/24/2019. She presented to the emergency department complaining of back pain after a fall.  Patient was trying on her shoes and trying to get one off when he she lost her balance and fell backwards.  Patient's back hit a cedar chest on the left side.  Patient had immediate pain that was worse with movement and breathing.  Pain does not radiate into the abdomen, neck or chest.  In the emergency department a CT showed multiple rib fractures on the left side of T7-10 with fractures of the transverse process in the T8 and 9.  Patient also on Coumadin for atrial fibrillation with an INR of 3.3.  There is a moderate amount of swelling on the left side of her back.  Today patient with worsening pain and nausea, has not been able to stay out of her pain to manage this for discharge  home.  Patient with increased nausea, most likely related to increased pain.  Patient with 4 rib fractures and 2 transverse process fractures.  Patient required IV medications for symptom management. Patient was admitted to inpatient status and had physical therapy and Occupational Therapy evaluate for possible TCU placement. Patient with gradual improvement in pain. Seen by PT and OT who recommended patient return home with family assist. Patient notes pain in left flank and back which is worse with movement. Notes pain is tolerable with scheduled Tylenol and naproxen as well as scheduled dilaudid. Patient denies shortness of breath and is maintaining oxygen saturations above 90% on room air. Denies nausea and is tolerating oral intake to maintain nutrition and hydration status. Patient is afebrile and hemodyanamically stable. Patient was fit with TLSO brace for additional comfort but patient notes she did not like brace and stated it actually caused increased discomfort.       Closed fracture of transverse process of thoracic vertebra (H) - T8-T9    Closed fracture of multiple ribs of left side - T7-10  Assessment: Patient presented after a fall to the emergency department as above.  Patient with noted fractures on her ribs and transverse process of the T8 and 9.  Patient will be admitted to observation status for pain control and consult to surgery team for trauma. 11/28- Patient with gradual improvement in pain. Seen by PT and OT who recommended patient return home with family assist. Patient notes pain in left flank and back which is worse with movement. Notes pain is tolerable with scheduled Tylenol and naproxen as well as scheduled dilaudid. Patient denies shortness of breath and is maintaining oxygen saturations above 90% on room air. Denies nausea and is tolerating oral intake to maintain nutrition and hydration status. Patient is afebrile and hemodyanamically stable. Patient was fit with TLSO brace for  additional comfort but patient notes she did not like brace and stated it actually caused increased discomfort.   Plan: Patient medically stable for hospital discharge. Prescription written for oral dilaudid 2 mg three times daily as needed for pain. Recommended continuing scheduled Tylenol 975 mg three times daily and naproxen 250 mg twice daily. Recommended daily Miralax while patient is taking opiates. TLSO was ordered for comfort only and patient can choose not to wear this. Discussed limiting activity for several weeks and patient is agreeable. Recommended follow up with PCP within one week for hospital follow up.       HTN, goal below 140/90  Assessment: Chronic and stable with current blood pressure high normal. Patient normally takes Norvasc 2.5 mg twice a day and Dyazide 37-25 daily.. 11/28: Patient stable with no acute concerns. Blood pressure slightly elevated during hospitalization likely secondary to pain. Norvasc was increased during hospitalization to 5 mg in the morning and 2.5 mg in the evening.   Plan: Recommended continuing increased Norvasc dosing after discharge and resuming prior to admission dosing of Dyazide after discharge. Follow up with PCP as above.       Long term current use of anticoagulant therapy    Chronic atrial fibrillation  Assessment: Patient with chronic atrial fibrillation currently on Coumadin with INR of 3.3. 11/28: Stable, INR stable with dosing per pharmacy recommendations.  Plan: Per pharmacy recommendations, recommended daily coumadin at 7.5 mg with recheck of INR at INR clinic on 12/2 or 12/3      Fall  Assessment: Patient presented after fall as above at home.  Patient tripped while trying to take off her shoe and fell backwards into a cedar chest. 11/28: No new concerns with plan as above  Plan: Plan as above.    Consultations This Hospital Stay   PHARMACY TO DOSE WARFARIN  SURGERY GENERAL IP CONSULT  PHYSICAL THERAPY ADULT IP CONSULT  OCCUPATIONAL THERAPY ADULT IP  CONSULT  PHARMACY TO DOSE WARFARIN  CARE TRANSITION RN/SW IP CONSULT    Code Status   Full Code    Time Spent on this Encounter   I, Joseph Hester NP, personally saw the patient today and spent greater than 30 minutes discharging this patient.       Joseph Hester NP  [facility]  ______________________________________________________________________    Physical Exam   Vital Signs: Temp: 97  F (36.1  C) Temp src: Oral BP: (!) 144/60 Pulse: 66 Heart Rate: 66 Resp: 16 SpO2: 99 % O2 Device: None (Room air)    Weight: 145 lbs 3.2 oz  Constitutional: awake, alert, cooperative, no apparent distress, and appears stated age  Respiratory: No increased work of breathing, good air exchange, clear to auscultation bilaterally, no crackles or wheezing  Cardiovascular: regular rate and rhythm, normal S1 and S2 and no murmur noted  GI: normal bowel sounds, non-distended and non-tender  Skin: no bruising or bleeding and normal skin color, texture, turgor  Musculoskeletal: no lower extremity pitting edema present; there is no redness, warmth, or swelling of the joints  Neurologic: Awake, alert, oriented to name, place and time.    Primary Care Physician   ERIC AMIN    Discharge Orders      ORTHOTICS REFERRAL      Reason for your hospital stay    You were in the hospital after a fall and found to have rib fractures.     Activity    Your activity upon discharge: activity as tolerated.   Use your incentive spirometer every hour while awake to prevent pneumonia.     Follow-up and recommended labs and tests     Follow up with primary care provider, ERIC AMIN, within 7-14 days for hospital follow- up.  The following labs/tests are recommended: INR.      Follow up with INR clinic for INR check on 12/2/19 or 12/3/19.     Diet    Follow this diet upon discharge: Orders Placed This Encounter      Room Service      Regular Diet Adult       Significant Results and Procedures   Most Recent 3 CBC's:  Recent  Labs   Lab Test 11/28/19  0538 11/27/19  0610 11/26/19  0522  11/25/19  0533   WBC 6.9 13.4*  --   --  6.9   HGB 10.7* 11.3* 11.1*   < > 10.4*   MCV 99 98  --   --  99    268  --   --  250    < > = values in this interval not displayed.     Most Recent 3 BMP's:  Recent Labs   Lab Test 11/28/19  0538 11/27/19  0610 11/25/19  0533    141 143   POTASSIUM 3.6 3.4 3.4   CHLORIDE 106 107 107   CO2 30 31 32   BUN 18 19 15   CR 1.17* 1.10* 0.93   ANIONGAP 5 3 4   JOSE CARLOS 8.4* 8.5 8.7   GLC 82 98 91   ,   Results for orders placed or performed during the hospital encounter of 11/24/19   CT Chest w Contrast    Narrative    CT CHEST WITH CONTRAST  11/24/2019 3:29 PM    HISTORY:  Fall with posterior contusion over the inferior aspect of  the left scapula and over the left lower costal margin/CVA area.    TECHNIQUE: Scans obtained from the apices through the diaphragm with  IV contrast. 75mL, Isovue 370 IV injected. Radiation dose for this  scan was reduced using automated exposure control, adjustment of the  mA and/or kV according to patient size, or iterative reconstruction  technique.    COMPARISON:  None available.    FINDINGS:  Vascular: Ectasia of the ascending thoracic aorta measuring 3.6 cm in  diameter. No cardiomegaly or pericardial effusion. Multiple prominent  mediastinal lymph nodes, for example 1.2 cm short axis right  pretracheal lymph node (series 2 image 22), could be reactive.  Moderate atherosclerotic vascular calcification of the coronary  arteries and thoracic aorta.    Lungs and pleura: No pleural effusion or pneumothorax. No suspicious  focal pulmonary opacities.    Upper abdomen: Limited evaluation of the upper abdomen due to lack of  coverage.    Bones and soft tissue: Few mildly displaced fractures of the 7th  through the 10th left ribs along the costovertebral junctions (series  7 images 105, 127, 149 and 177) as well as the adjacent 8th and 9th  left transverse processes (series 7 images 135  and 160). Multilevel  degenerative changes of the visualized spine. Diffuse heterogenous  appearance of the thoracic vertebrae with sclerotic and lucent areas  of indeterminate etiology.      Impression    IMPRESSION:   1. Mildly displaced fractures of the 7th through the 10th left ribs  along the costovertebral junctions as well as the adjacent transverse  processes of T8 and T9.  2. Diffuse heterogenous appearance of the thoracic vertebrae of  indeterminate etiology, can be further evaluated with abdominal MRI if  clinically warranted.      LEONIE HIGGINBOTHAM MD   XR Chest Port 1 View    Narrative    CHEST ONE VIEW PORTABLE   11/25/2019 10:02 AM     HISTORY: follow up rib fractures, shortness of breath, chest wall  pain, anemia, elevated INR    COMPARISON: 5/19/2017 chest x-ray. 11/24/2019 CT chest      Impression    IMPRESSION:   1. Redemonstrated enlarged cardiac silhouette.  2. The left rib fractures and transverse process fractures identified  on the recent CT are not visualized on chest x-ray. No convincing  evidence for pneumothorax, probable skin fold over the left apex.  3. Stable linear density at the left lung base unchanged since 2017  consistent with scarring. No acute pulmonary disease.  4. Normal caliber pulmonary vessels.    KEO MARIA MD   X-ray Abdomen 1 vw    Narrative    ABDOMEN ONE VIEW  11/25/2019 10:03 AM     HISTORY: Abdomen pain, elevated INR, anemia. History of fall.    COMPARISON: None.      FINDINGS:  There is a nonspecific bowel gas pattern with gas in both  small and large bowel. No obvious renal or ureteral calculus is seen.  No abnormally dilated gas-filled loops of bowel to suggest bowel  obstruction. Moderate amount of stool is projected over the colon.      Impression    IMPRESSION:  1. Nonspecific bowel gas pattern without evidence for bowel  obstruction.  2. Moderate amount of stool in the colon.    SEAN HICKS MD       Discharge Medications   Current Discharge Medication  List      START taking these medications    Details   acetaminophen (TYLENOL) 325 MG tablet Take 3 tablets (975 mg) by mouth every 8 hours    Associated Diagnoses: Closed fracture of multiple ribs of left side, initial encounter; Closed fracture of transverse process of thoracic vertebra, initial encounter (H)      HYDROmorphone (DILAUDID) 2 MG tablet Take 1 tablet (2 mg) by mouth 3 times daily as needed for severe pain  Qty: 21 tablet, Refills: 0    Associated Diagnoses: Closed fracture of multiple ribs of left side, initial encounter; Closed fracture of transverse process of thoracic vertebra, initial encounter (H)      polyethylene glycol (MIRALAX/GLYCOLAX) packet Take 17 g by mouth daily  Qty: 30 packet, Refills: 0    Associated Diagnoses: Closed fracture of transverse process of thoracic vertebra, initial encounter (H)         CONTINUE these medications which have CHANGED    Details   !! amLODIPine (NORVASC) 2.5 MG tablet Take 1 tablet (2.5 mg) by mouth every evening  Qty: 30 tablet, Refills: 0    Associated Diagnoses: HTN, goal below 140/90      !! amLODIPine (NORVASC) 5 MG tablet Take 1 tablet (5 mg) by mouth every morning  Qty: 30 tablet, Refills: 0    Associated Diagnoses: HTN, goal below 140/90      warfarin ANTICOAGULANT (COUMADIN) 2.5 MG tablet Take 3 tablets (7.5 mg) by mouth daily       !! - Potential duplicate medications found. Please discuss with provider.      CONTINUE these medications which have NOT CHANGED    Details   ascorbic acid (VITAMIN C) 1000 MG TABS Take 1 tablet by mouth      Calcium Carb-Cholecalciferol (GNP VITAMIN D-400)  MG-UNIT TABS Take 1 tablet by mouth      levothyroxine (SYNTHROID, LEVOTHROID) 150 MCG tablet Take 150 mcg by mouth      meclizine (ANTIVERT) 12.5 MG tablet Take one tablet by mouth as needed for dizziness      METFORMIN HCL PO Take 500 mg by mouth daily (with breakfast)       Multiple Vitamin (MULTI-VITAMINS) TABS Take 1 tablet by mouth      naproxen sodium  220 MG capsule Take 220 mg by mouth 2 times daily (with meals)      triamterene-hydrochlorothiazide (DYAZIDE) 37.5-25 MG per capsule Take 1 capsule by mouth      Vaginal Lubricant (REPLENS) GEL Place  Vaginally twice weekly  Qty: 45 g, Refills: 1    Associated Diagnoses: Vaginitis; Menopausal vaginal dryness         STOP taking these medications       ibuprofen (ADVIL/MOTRIN) 200 MG tablet Comments:   Reason for Stopping:             Allergies   No Known Allergies

## 2019-11-29 ENCOUNTER — TELEPHONE (OUTPATIENT)
Dept: FAMILY MEDICINE | Facility: OTHER | Age: 84
End: 2019-11-29

## 2019-11-29 NOTE — TELEPHONE ENCOUNTER
ED / Discharge Outreach Protocol    Patient Contact    Attempt # 1    Was call answered?  No.  Left message on voicemail with information to call me back.    Rubén Thomas, RN, BSN

## 2019-11-29 NOTE — TELEPHONE ENCOUNTER
RN to call for hospital follow up:    Reason for follow up: Estela CAN Sarahmarycarmen appeared on our list for being seen in an Emergency Room or a recent Hospital discharge.    Admitting date: 11/24/2019  Discharge date: 11/28/2019  [facility]  Reason for visit: Closed fracture of multiple ribs of left side.    Chrissy Sinha RN BSN

## 2019-12-02 NOTE — TELEPHONE ENCOUNTER
ED / Discharge Outreach Protocol    Patient Contact    Attempt # 2    Was call answered?  No.  Left message on voicemail with information to call me back.    Post Discharge Medication Reconciliation Status: medication reconcilation previously completed during another office visit.

## 2019-12-03 NOTE — PROGRESS NOTES
"Estela Cabrera  Gender: female  : 1935  [address] [place] [address] [phone] (home)     Medical Record: [id number]  Pharmacy:    [id number] - [place] - [address] [place] [facility]  [address]  [id number] [place] [place] [facility] [address] [facility]  Primary Care Provider: Sally Kelly    Parent's names are: Data Unavailable (mother) and Data Unavailable (father).      [facility]  December 3, 2019     Discharge Phone Call:  Key Words/Key Times      Introduction - AIDET (Acknowledge, Introduce, Duration, Explanation)      Empathy-   We are calling to see how you are since your recent stay in the hospital?     Call back COMMENTS:       Clinical Questions -  (f/u appts, medication side effects/purpose, ability to care for self at home) \"For your safety, it is important to us that you understand the purpose and side effects of your medications, can you tell me what your new medications are?\"     Call back COMMENTS: Patient understands about her pain medication talked about taking PRN tylenol that is ordered on her med rec. Talked bout her follow-up appointment and that she will need to set this up with her primary  Doctor, and the importance of this appointment. Talked about coumadin and the need for a follow-up appointment for checking the INR, patient has this scheduled.         Staff Recognition -  We like to recognize staff and physicians who have done an excellent job.  Do you remember any people from your care team that you would like recognize?     Call back COMMENTS: Best nursing I have ever had.       Very Good Care -  We want to provide very good care to all patients.  How was your care?     Call back COMMENTS: I loved every nurse I would like to move in....      Opportunities for Improvement -  Our goal is to be the best.  Do you have any suggestions for things " that we could improve upon?     Call back COMMENTS: nothing      Thank You

## 2020-05-22 ENCOUNTER — APPOINTMENT (OUTPATIENT)
Dept: ULTRASOUND IMAGING | Facility: CLINIC | Age: 85
End: 2020-05-22
Attending: EMERGENCY MEDICINE
Payer: COMMERCIAL

## 2020-05-22 ENCOUNTER — HOSPITAL ENCOUNTER (EMERGENCY)
Facility: CLINIC | Age: 85
Discharge: HOME | End: 2020-05-22
Attending: EMERGENCY MEDICINE | Admitting: EMERGENCY MEDICINE
Payer: COMMERCIAL

## 2020-05-22 VITALS
DIASTOLIC BLOOD PRESSURE: 97 MMHG | HEIGHT: 65 IN | OXYGEN SATURATION: 99 % | TEMPERATURE: 98.7 F | WEIGHT: 147 LBS | HEART RATE: 64 BPM | BODY MASS INDEX: 24.49 KG/M2 | RESPIRATION RATE: 18 BRPM | SYSTOLIC BLOOD PRESSURE: 172 MMHG

## 2020-05-22 DIAGNOSIS — Z79.01 WARFARIN ANTICOAGULATION: ICD-10-CM

## 2020-05-22 DIAGNOSIS — S80.10XA HEMATOMA OF LOWER LEG: ICD-10-CM

## 2020-05-22 LAB — INR PPP: 2.92 (ref 0.86–1.14)

## 2020-05-22 PROCEDURE — 93971 EXTREMITY STUDY: CPT | Mod: RT

## 2020-05-22 PROCEDURE — 85610 PROTHROMBIN TIME: CPT | Performed by: EMERGENCY MEDICINE

## 2020-05-22 PROCEDURE — 99284 EMERGENCY DEPT VISIT MOD MDM: CPT | Mod: Z6 | Performed by: EMERGENCY MEDICINE

## 2020-05-22 PROCEDURE — 99284 EMERGENCY DEPT VISIT MOD MDM: CPT | Mod: 25 | Performed by: EMERGENCY MEDICINE

## 2020-05-22 ASSESSMENT — MIFFLIN-ST. JEOR: SCORE: 1117.67

## 2020-05-22 NOTE — DISCHARGE INSTRUCTIONS
Try to elevate the leg is much as possible to decrease the swelling.  It is best to get it at or above the level of your heart.    Your INR was therapeutic at 2.92.  I will try to have the Coumadin nurse call you if any changes need to be made.    If you have any concerns return at any time.    I hope that this heals quickly!!

## 2020-05-22 NOTE — ED TRIAGE NOTES
Noticed large bruise to right lower calf about 3 days ago.  Right calf if now painful and swollen.  No known fall or hitting the leg, is on coumadin for Afib.  Requesting her INR to be checked as it has been 2.5 months

## 2020-05-22 NOTE — ED AVS SNAPSHOT
[facility]  [address] [place] [address]  Phone:  [phone]  Fax:  [phone]                                    Estela Cabrera   MRN: [id number]    Department:  [facility]   Date of Visit:  5/22/2020           After Visit Summary Signature Page    I have received my discharge instructions, and my questions have been answered. I have discussed any challenges I see with this plan with the nurse or doctor.    ..........................................................................................................................................  Patient/Patient Representative Signature      ..........................................................................................................................................  Patient Representative Print Name and Relationship to Patient    ..................................................               ................................................  Date                                   Time    ..........................................................................................................................................  Reviewed by Signature/Title    ...................................................              ..............................................  Date                                               Time          22EPIC Rev 08/18

## 2020-05-23 NOTE — ED PROVIDER NOTES
History     Chief Complaint   Patient presents with     Leg Pain     HPI  History per patient and medical records    This is an 85-year-old female, history of atrial fibrillation on Coumadin, presenting with leg pain.  Patient noted a bruise on her right lateral lower extremity 3 days ago.  There was some swelling around the bruise.  She did try to elevate her leg but notes that she has had increased swelling lower in the leg below the bruise and has tenderness and pain around the area of the bruise.  She describes some tightness in her ankle.  She cannot remember any specific trauma to cause the bruising.  No numbness or tingling of the extremities.  She has some small bruises on her left arm but has not noted any other significant bruising or bleeding.  No nosebleeds, no blackness or blood in the stools.  She has not had her INR checked since early March but has been taking it as prescribed.  No fevers, chills, chest pain, shortness of breath, cold or cough symptoms, bowel or bladder changes.  She has history of migraine.  No current headache.    Allergies:  No Known Allergies    Problem List:    Patient Active Problem List    Diagnosis Date Noted     Fall 11/24/2019     Priority: Medium     Closed fracture of multiple ribs of left side - T7-10 11/24/2019     Priority: Medium     Closed fracture of transverse process of thoracic vertebra (H) - T8-T9 11/24/2019     Priority: Medium     Long term current use of anticoagulant therapy 11/24/2019     Priority: Medium     Rib fractures 11/24/2019     Priority: Medium     Chronic atrial fibrillation 08/01/2018     Priority: Medium     Migraine headache 02/15/2013     Priority: Medium     since age 20       Gastritis 02/15/2013     Priority: Medium     HTN, goal below 140/90 02/15/2013     Priority: Medium     CARDIOVASCULAR SCREENING; LDL GOAL LESS THAN 130 02/15/2013     Priority: Medium        Past Medical History:    Past Medical History:   Diagnosis Date     Atrial  "fibrillation (H)      Chronic atrial fibrillation 8/1/2018     Diverticulitis      High blood pressure      HTN, goal below 140/90 2/15/2013     Long term current use of anticoagulant therapy 11/24/2019     Thyroid disease      Vertigo        Past Surgical History:    Past Surgical History:   Procedure Laterality Date     ankle surgeries       HYSTERECTOMY       TONSILLECTOMY         Family History:    Family History   Problem Relation Age of Onset     Hypertension Mother      Thyroid Disease Mother      Macular Degeneration Mother      Hypertension Sister      Thyroid Disease Sister      Hypertension Daughter      Thyroid Disease Daughter      Glaucoma No family hx of        Social History:  Marital Status:   [4]  Social History     Tobacco Use     Smoking status: Never Smoker     Smokeless tobacco: Never Used   Substance Use Topics     Alcohol use: No     Drug use: No        Medications:    acetaminophen (TYLENOL) 325 MG tablet  amLODIPine (NORVASC) 2.5 MG tablet  amLODIPine (NORVASC) 5 MG tablet  ascorbic acid (VITAMIN C) 1000 MG TABS  Calcium Carb-Cholecalciferol (GNP VITAMIN D-400)  MG-UNIT TABS  HYDROmorphone (DILAUDID) 2 MG tablet  levothyroxine (SYNTHROID, LEVOTHROID) 150 MCG tablet  meclizine (ANTIVERT) 12.5 MG tablet  metFORMIN (GLUCOPHAGE) 500 MG tablet  Multiple Vitamin (MULTI-VITAMINS) TABS  naproxen sodium 220 MG capsule  polyethylene glycol (MIRALAX/GLYCOLAX) packet  triamterene-hydrochlorothiazide (DYAZIDE) 37.5-25 MG per capsule  Vaginal Lubricant (REPLENS) GEL  warfarin ANTICOAGULANT (COUMADIN) 2.5 MG tablet      Review of Systems  All other ROS reviewed and are negative or non-contributory except as stated in HPI.    Physical Exam   BP: (!) 172/97  Pulse: 64  Temp: 98.7  F (37.1  C)  Resp: 18  Height: 165.1 cm (5' 5\")  Weight: 66.7 kg (147 lb)  SpO2: 99 %      Physical Exam  Vitals signs and nursing note reviewed.   Constitutional:       Appearance: Normal appearance. She is normal " weight.      Comments: Very pleasant, healthy-appearing, older female sitting in the bed   HENT:      Head: Normocephalic.   Eyes:      Conjunctiva/sclera: Conjunctivae normal.      Pupils: Pupils are equal, round, and reactive to light.   Neck:      Musculoskeletal: Normal range of motion and neck supple.   Cardiovascular:      Rate and Rhythm: Normal rate.      Pulses: Normal pulses.      Heart sounds: Normal heart sounds.      Comments: Irregularly irregular  Pulmonary:      Effort: Pulmonary effort is normal.      Breath sounds: Normal breath sounds.   Musculoskeletal: Normal range of motion.        Legs:    Skin:     General: Skin is warm and dry.      Comments: 2 small areas of ecchymosis on the left forearm   Neurological:      General: No focal deficit present.      Mental Status: She is alert and oriented to person, place, and time.   Psychiatric:         Mood and Affect: Mood normal.         Behavior: Behavior normal.         ED Course (with Medical Decision Making)    Pt seen and examined by me.  RN and EPIC notes reviewed.      Patient with some ecchymosis along the lateral side of her right lower calf.  She has some tenderness over the area of ecchymosis and some tightness in her ankle.  She has edema distal to the ecchymosis.  Small bruises on her left arm noted but no other significant bruising.  I am going to check INR in case it is high.  I am also going to do an ultrasound of the area to be sure there is no clot or significant deep hematoma.    INR is in therapeutic range at 2.92.  She has no DVT but does have a small fluid collection, likely hematoma.    I did contact patient's primary care provider about her ED visit.  She will contact the Coumadin clinic for follow-up.  I recommend that patient rest and elevate the leg to decrease the swelling.  Continue Coumadin as prescribed.  Follow-up with primary care provider as needed and return at anytime for worsening, changes or concerns.         Procedures      Results for orders placed or performed during the hospital encounter of 05/22/20 (from the past 24 hour(s))   INR   Result Value Ref Range    INR 2.92 (H) 0.86 - 1.14   US Lower Extremity Venous Duplex Right    Narrative    ULTRASOUND LOWER EXTREMITY VENOUS DUPLEX RIGHT 5/22/2020 12:35 PM    CLINICAL HISTORY: History of atrial fibrillation; on Coumadin;  swelling and pain - bruise. Evaluate for hematoma versus clot.    TECHNIQUE: Venous Duplex ultrasound of the right lower extremity with  and without compression, augmentation and duplex. Color flow and  spectral Doppler with waveform analysis performed.    COMPARISON: None.    FINDINGS: Exam includes the common femoral, femoral, popliteal, and  contralateral common femoral veins as well as segmentally visualized  deep calf veins and greater saphenous vein.     RIGHT: No deep vein thrombosis. No superficial thrombophlebitis. No  popliteal cyst. Along the lateral lower right leg, there is an  irregular hypoechoic collection that measures 2.5 x 1.4 x 1.0 cm and  may represent a small hematoma or thrombosed varicosity.      Impression    IMPRESSION:  1.  No deep venous thrombosis in the right lower extremity.    TIMOTEO KNIGHT MD       Medications - No data to display    Assessments & Plan     I have reviewed the findings, diagnosis, plan and need for follow up with the patient.    Discharge Medication List as of 5/22/2020  1:17 PM          Final diagnoses:   Hematoma of lower leg   Warfarin anticoagulation     Disposition: Patient discharged home in stable condition.  Plan as above.  Return for concerns.     Note: Chart documentation done in part with Dragon Voice Recognition software. Although reviewed after completion, some word and grammatical errors may remain.     5/22/2020   [facility] EMERGENCY DEPARTMENT     Sangeeta Fox MD  05/23/20 0123

## 2020-09-14 ENCOUNTER — HOSPITAL ENCOUNTER (EMERGENCY)
Facility: CLINIC | Age: 85
Discharge: HOME | End: 2020-09-14
Attending: PHYSICIAN ASSISTANT | Admitting: PHYSICIAN ASSISTANT
Payer: COMMERCIAL

## 2020-09-14 VITALS
TEMPERATURE: 98.3 F | DIASTOLIC BLOOD PRESSURE: 88 MMHG | RESPIRATION RATE: 18 BRPM | OXYGEN SATURATION: 98 % | BODY MASS INDEX: 25.66 KG/M2 | HEART RATE: 87 BPM | SYSTOLIC BLOOD PRESSURE: 175 MMHG | WEIGHT: 154.2 LBS

## 2020-09-14 DIAGNOSIS — R60.0 BILATERAL LOWER EXTREMITY EDEMA: ICD-10-CM

## 2020-09-14 LAB
ALBUMIN SERPL-MCNC: 3.9 G/DL (ref 3.4–5)
ALP SERPL-CCNC: 91 U/L (ref 40–150)
ALT SERPL W P-5'-P-CCNC: 16 U/L (ref 0–50)
ANION GAP SERPL CALCULATED.3IONS-SCNC: 5 MMOL/L (ref 3–14)
AST SERPL W P-5'-P-CCNC: 22 U/L (ref 0–45)
BASOPHILS # BLD AUTO: 0.1 10E9/L (ref 0–0.2)
BASOPHILS NFR BLD AUTO: 0.7 %
BILIRUB SERPL-MCNC: 0.5 MG/DL (ref 0.2–1.3)
BUN SERPL-MCNC: 11 MG/DL (ref 7–30)
CALCIUM SERPL-MCNC: 9.6 MG/DL (ref 8.5–10.1)
CHLORIDE SERPL-SCNC: 110 MMOL/L (ref 94–109)
CO2 SERPL-SCNC: 28 MMOL/L (ref 20–32)
CREAT SERPL-MCNC: 0.98 MG/DL (ref 0.52–1.04)
CRP SERPL-MCNC: 4 MG/L (ref 0–8)
DIFFERENTIAL METHOD BLD: ABNORMAL
EOSINOPHIL NFR BLD AUTO: 3.2 %
ERYTHROCYTE [DISTWIDTH] IN BLOOD BY AUTOMATED COUNT: 13.2 % (ref 10–15)
ERYTHROCYTE [SEDIMENTATION RATE] IN BLOOD BY WESTERGREN METHOD: 30 MM/H (ref 0–30)
GFR SERPL CREATININE-BSD FRML MDRD: 53 ML/MIN/{1.73_M2}
GLUCOSE SERPL-MCNC: 86 MG/DL (ref 70–99)
HCT VFR BLD AUTO: 32.9 % (ref 35–47)
HGB BLD-MCNC: 10.5 G/DL (ref 11.7–15.7)
IMM GRANULOCYTES # BLD: 0 10E9/L (ref 0–0.4)
IMM GRANULOCYTES NFR BLD: 0.5 %
INR PPP: 2.45 (ref 0.86–1.14)
LYMPHOCYTES # BLD AUTO: 1.8 10E9/L (ref 0.8–5.3)
LYMPHOCYTES NFR BLD AUTO: 21.1 %
MCH RBC QN AUTO: 32.7 PG (ref 26.5–33)
MCHC RBC AUTO-ENTMCNC: 31.9 G/DL (ref 31.5–36.5)
MCV RBC AUTO: 103 FL (ref 78–100)
MONOCYTES # BLD AUTO: 0.9 10E9/L (ref 0–1.3)
MONOCYTES NFR BLD AUTO: 10.6 %
NEUTROPHILS # BLD AUTO: 5.6 10E9/L (ref 1.6–8.3)
NEUTROPHILS NFR BLD AUTO: 63.9 %
NRBC # BLD AUTO: 0 10*3/UL
NRBC BLD AUTO-RTO: 0 /100
PLATELET # BLD AUTO: 280 10E9/L (ref 150–450)
POTASSIUM SERPL-SCNC: 3.5 MMOL/L (ref 3.4–5.3)
PROT SERPL-MCNC: 7.2 G/DL (ref 6.8–8.8)
RBC # BLD AUTO: 3.21 10E12/L (ref 3.8–5.2)
SODIUM SERPL-SCNC: 143 MMOL/L (ref 133–144)
WBC # BLD AUTO: 8.7 10E9/L (ref 4–11)

## 2020-09-14 PROCEDURE — 86140 C-REACTIVE PROTEIN: CPT | Performed by: PHYSICIAN ASSISTANT

## 2020-09-14 PROCEDURE — 99283 EMERGENCY DEPT VISIT LOW MDM: CPT | Performed by: PHYSICIAN ASSISTANT

## 2020-09-14 PROCEDURE — 85610 PROTHROMBIN TIME: CPT | Performed by: PHYSICIAN ASSISTANT

## 2020-09-14 PROCEDURE — 99284 EMERGENCY DEPT VISIT MOD MDM: CPT | Mod: Z6 | Performed by: PHYSICIAN ASSISTANT

## 2020-09-14 PROCEDURE — 80053 COMPREHEN METABOLIC PANEL: CPT | Performed by: PHYSICIAN ASSISTANT

## 2020-09-14 PROCEDURE — 85652 RBC SED RATE AUTOMATED: CPT | Performed by: PHYSICIAN ASSISTANT

## 2020-09-14 PROCEDURE — 36415 COLL VENOUS BLD VENIPUNCTURE: CPT | Performed by: PHYSICIAN ASSISTANT

## 2020-09-14 PROCEDURE — 85025 COMPLETE CBC W/AUTO DIFF WBC: CPT | Performed by: PHYSICIAN ASSISTANT

## 2020-09-14 RX ORDER — POTASSIUM CHLORIDE 1500 MG/1
20 TABLET, EXTENDED RELEASE ORAL DAILY
Qty: 7 TABLET | Refills: 0 | Status: SHIPPED | OUTPATIENT
Start: 2020-09-14

## 2020-09-14 RX ORDER — FUROSEMIDE 20 MG
20 TABLET ORAL DAILY
Qty: 7 TABLET | Refills: 0 | Status: SHIPPED | OUTPATIENT
Start: 2020-09-14

## 2020-09-14 NOTE — DISCHARGE INSTRUCTIONS
Please start taking the furosemide with a potassium tablet daily in the morning.  This will cause you to urinate more frequently but will help pull the extra fluid off of your legs.      Wearing compression stockings can help with fluid in your legs as well.  A referral to the wound clinic was provided to help you manage the weeping from your right leg. They should call you to schedule a follow up visit in the next few days.    It is very important you also contact your clinic provider to schedule a follow-up visit in the next few days.  I only gave you a week's worth of the furosemide, the water pill, because I want you to be reevaluated before you continue this medication as it can cause your potassium to drop.  Taking the potassium supplement that was also prescribed should help prevent this.    If you develop any worsening symptoms in the meantime please return to the emergency department.    Thank you for choosing [facility]'s Emergency Department. It was a pleasure taking care of you today. If you have any questions, please call [phone].    Aga Delcid PA-C

## 2020-09-14 NOTE — ED PROVIDER NOTES
History     Chief Complaint   Patient presents with     Leg Swelling     HPI  Estela Cabrera is a 85 year old female who presents to the emergency department for concerns of leg swelling. The patient reports he has had issues with leg swelling for the last 4 months.  It started when she injured herself working outside with her  and she hit her right leg on something, causing a hematoma.  She was seen in the ED at that time with a negative work-up for blood clot.  Since then she has had persistent swelling in the right leg and now into the left leg.  She went to [facility] ED on August 28 for this leg swelling along with extremity weakness and was found to have low potassium.  She was hospitalized for a day and discharged home.  She states she is on warfarin for her history of atrial fibrillation but is not taking potassium daily and is not on a water pill.  She states in the last couple days her right leg started weeping fluid which is why she came here.  She states there is pain like little needles stabbing her in the both lower legs at times, and her legs feel tight.  She has not had any fevers or body aches.  No shortness of breath or chest pain reported.  She has not done anything for her symptoms.      Allergies:  No Known Allergies    Problem List:    Patient Active Problem List    Diagnosis Date Noted     Fall 11/24/2019     Priority: Medium     Closed fracture of multiple ribs of left side - T7-10 11/24/2019     Priority: Medium     Closed fracture of transverse process of thoracic vertebra (H) - T8-T9 11/24/2019     Priority: Medium     Long term current use of anticoagulant therapy 11/24/2019     Priority: Medium     Rib fractures 11/24/2019     Priority: Medium     Chronic atrial fibrillation 08/01/2018     Priority: Medium     Migraine headache 02/15/2013     Priority: Medium     since age 20       Gastritis 02/15/2013     Priority: Medium     HTN, goal below 140/90 02/15/2013      Priority: Medium     CARDIOVASCULAR SCREENING; LDL GOAL LESS THAN 130 02/15/2013     Priority: Medium        Past Medical History:    Past Medical History:   Diagnosis Date     Atrial fibrillation (H)      Chronic atrial fibrillation (H) 8/1/2018     Diverticulitis      High blood pressure      HTN, goal below 140/90 2/15/2013     Long term current use of anticoagulant therapy 11/24/2019     Thyroid disease      Vertigo        Past Surgical History:    Past Surgical History:   Procedure Laterality Date     ankle surgeries       HYSTERECTOMY       TONSILLECTOMY         Family History:    Family History   Problem Relation Age of Onset     Hypertension Mother      Thyroid Disease Mother      Macular Degeneration Mother      Hypertension Sister      Thyroid Disease Sister      Hypertension Daughter      Thyroid Disease Daughter      Glaucoma No family hx of        Social History:  Marital Status:   [4]  Social History     Tobacco Use     Smoking status: Never Smoker     Smokeless tobacco: Never Used   Substance Use Topics     Alcohol use: No     Drug use: No        Medications:    furosemide (LASIX) 20 MG tablet  potassium chloride ER (K-TAB) 20 MEQ CR tablet  acetaminophen (TYLENOL) 325 MG tablet  amLODIPine (NORVASC) 2.5 MG tablet  amLODIPine (NORVASC) 5 MG tablet  ascorbic acid (VITAMIN C) 1000 MG TABS  Calcium Carb-Cholecalciferol (GNP VITAMIN D-400)  MG-UNIT TABS  HYDROmorphone (DILAUDID) 2 MG tablet  levothyroxine (SYNTHROID, LEVOTHROID) 150 MCG tablet  meclizine (ANTIVERT) 12.5 MG tablet  metFORMIN (GLUCOPHAGE) 500 MG tablet  Multiple Vitamin (MULTI-VITAMINS) TABS  naproxen sodium 220 MG capsule  polyethylene glycol (MIRALAX/GLYCOLAX) packet  triamterene-hydrochlorothiazide (DYAZIDE) 37.5-25 MG per capsule  Vaginal Lubricant (REPLENS) GEL  warfarin ANTICOAGULANT (COUMADIN) 2.5 MG tablet          Review of Systems   All other systems reviewed and are negative.      Physical Exam   BP: (!)  175/88  Pulse: 87  Temp: 98.3  F (36.8  C)  Resp: 18  Weight: 69.9 kg (154 lb 3.2 oz)  SpO2: 98 %      Physical Exam  Vitals signs and nursing note reviewed.   Constitutional:       General: She is not in acute distress.     Appearance: Normal appearance. She is well-developed. She is not ill-appearing, toxic-appearing or diaphoretic.   HENT:      Head: Normocephalic and atraumatic.      Nose: Nose normal.   Eyes:      Conjunctiva/sclera: Conjunctivae normal.      Pupils: Pupils are equal, round, and reactive to light.   Neck:      Musculoskeletal: Neck supple.   Cardiovascular:      Rate and Rhythm: Normal rate. Rhythm irregular.      Heart sounds: Normal heart sounds.   Pulmonary:      Effort: Pulmonary effort is normal. No respiratory distress.      Breath sounds: Normal breath sounds.   Abdominal:      General: Bowel sounds are normal. There is no distension.      Palpations: Abdomen is soft.      Tenderness: There is no abdominal tenderness.   Musculoskeletal:         General: No deformity.      Comments: Right lower extremity with moderate edema present, open area with clear weeping noted to anterior shin.  There are dry skin flakes to ankle region noted.  No significant warmth but mild erythema throughout.  Left lower extremity with mild to moderate edema present.  No weepage noted.  Both lower extremities with distal pulses intact.  Venous stasis changes bilaterally.   Skin:     General: Skin is warm and dry.   Neurological:      Mental Status: She is alert and oriented to person, place, and time. Mental status is at baseline.      Coordination: Coordination normal.   Psychiatric:         Mood and Affect: Mood normal.         Behavior: Behavior normal.         ED Course        Procedures      Results for orders placed or performed during the hospital encounter of 09/14/20 (from the past 24 hour(s))   CBC with platelets differential   Result Value Ref Range    WBC 8.7 4.0 - 11.0 10e9/L    RBC Count 3.21 (L)  3.8 - 5.2 10e12/L    Hemoglobin 10.5 (L) 11.7 - 15.7 g/dL    Hematocrit 32.9 (L) 35.0 - 47.0 %     (H) 78 - 100 fl    MCH 32.7 26.5 - 33.0 pg    MCHC 31.9 31.5 - 36.5 g/dL    RDW 13.2 10.0 - 15.0 %    Platelet Count 280 150 - 450 10e9/L    Diff Method Automated Method     % Neutrophils 63.9 %    % Lymphocytes 21.1 %    % Monocytes 10.6 %    % Eosinophils 3.2 %    % Basophils 0.7 %    % Immature Granulocytes 0.5 %    Nucleated RBCs 0 0 /100    Absolute Neutrophil 5.6 1.6 - 8.3 10e9/L    Absolute Lymphocytes 1.8 0.8 - 5.3 10e9/L    Absolute Monocytes 0.9 0.0 - 1.3 10e9/L    Absolute Basophils 0.1 0.0 - 0.2 10e9/L    Abs Immature Granulocytes 0.0 0 - 0.4 10e9/L    Absolute Nucleated RBC 0.0    Comprehensive metabolic panel   Result Value Ref Range    Sodium 143 133 - 144 mmol/L    Potassium 3.5 3.4 - 5.3 mmol/L    Chloride 110 (H) 94 - 109 mmol/L    Carbon Dioxide 28 20 - 32 mmol/L    Anion Gap 5 3 - 14 mmol/L    Glucose 86 70 - 99 mg/dL    Urea Nitrogen 11 7 - 30 mg/dL    Creatinine 0.98 0.52 - 1.04 mg/dL    GFR Estimate 53 (L) >60 mL/min/[1.73_m2]    GFR Estimate If Black 61 >60 mL/min/[1.73_m2]    Calcium 9.6 8.5 - 10.1 mg/dL    Bilirubin Total 0.5 0.2 - 1.3 mg/dL    Albumin 3.9 3.4 - 5.0 g/dL    Protein Total 7.2 6.8 - 8.8 g/dL    Alkaline Phosphatase 91 40 - 150 U/L    ALT 16 0 - 50 U/L    AST 22 0 - 45 U/L   CRP inflammation   Result Value Ref Range    CRP Inflammation 4.0 0.0 - 8.0 mg/L   Erythrocyte sedimentation rate auto   Result Value Ref Range    Sed Rate 30 0 - 30 mm/h   INR   Result Value Ref Range    INR 2.45 (H) 0.86 - 1.14       Medications - No data to display      Assessments & Plan (with Medical Decision Making)  Estela Cabrera is a 85 year old female who presented to the ED complaining of lower extremity swelling and pain.  This is been an ongoing issue for about 4 months now but in the last few days her right leg started weeping so she came here for evaluation.  On arrival to the ED her  blood pressure was elevated, otherwise she had normal vital signs.  Exam today demonstrated bilateral lower extremity edema with some venous stasis changes and an open weeping to the right anterior lower leg.  She was neurovascularly intact in the legs.  Otherwise unremarkable exam.  She is on warfarin and just had ultrasounds completed of her legs 2 weeks ago when she was hospitalized for low potassium at [facility].  She was agreeable to evaluating things further today with just labs to start with.  Her white count and CRP were both within normal limits, so I do not think infection is likely for a cause.  Her INR was therapeutic at 2.45 and with negative ultrasound a couple weeks ago DVT is unlikely.  Her LFTs were within normal limits and her potassium was 3.5 today.  I discussed these results with the patient and her spouse.  Clinically I suspect she has some chronic edema of the lower legs.  It looks like she had been on chlorthalidone up until she was discharged from the hospital on 8/29 because they thought her hypokalemia could be related to this medication.  I think she would benefit from a diuretic and after discussing things with her she was agreeable to trying furosemide 20 mg daily along with a pack potassium supplement to decrease risk of hypokalemia with this medication.  I will give her a week supply and advised that she contact her clinic provider in the next few days for close follow-up for lab reevaluation.  I also provided a referral to the wound care clinic to manage her open weeping from her leg.  She was encouraged to elevate and try compression to the legs as well to help with swelling.  She was given instructions on when to return to the ED.  All questions were answered and patient was discharged home in stable condition.     I have reviewed the nursing notes.    I have reviewed the findings, diagnosis, plan and need for follow up with the patient.     New Prescriptions     FUROSEMIDE (LASIX) 20 MG TABLET    Take 1 tablet (20 mg) by mouth daily    POTASSIUM CHLORIDE ER (K-TAB) 20 MEQ CR TABLET    Take 1 tablet (20 mEq) by mouth daily       Final diagnoses:   Bilateral lower extremity edema     Note: Chart documentation done in part with Dragon Voice Recognition software. Although reviewed after completion, some word and grammatical errors may remain.     9/14/2020   [facility] EMERGENCY DEPARTMENT     Aga Delcid PA-C  09/14/20 2147

## 2020-09-14 NOTE — ED AVS SNAPSHOT
[facility] Emergency Department  [address] [place] [address]  Phone:  [phone]  Fax:  [phone]                                    Estela Cabrera   MRN: [id number]    Department:  [facility] Emergency Department   Date of Visit:  9/14/2020           After Visit Summary Signature Page    I have received my discharge instructions, and my questions have been answered. I have discussed any challenges I see with this plan with the nurse or doctor.    ..........................................................................................................................................  Patient/Patient Representative Signature      ..........................................................................................................................................  Patient Representative Print Name and Relationship to Patient    ..................................................               ................................................  Date                                   Time    ..........................................................................................................................................  Reviewed by Signature/Title    ...................................................              ..............................................  Date                                               Time          22EPIC Rev 08/18        [employer]ine(s)  injected utilizing power injector system.

## 2024-04-18 ENCOUNTER — LAB REQUISITION (OUTPATIENT)
Dept: LAB | Facility: CLINIC | Age: 89
End: 2024-04-18

## 2024-04-18 DIAGNOSIS — Z11.1 ENCOUNTER FOR SCREENING FOR RESPIRATORY TUBERCULOSIS: ICD-10-CM

## 2024-04-19 PROCEDURE — 86481 TB AG RESPONSE T-CELL SUSP: CPT | Performed by: FAMILY MEDICINE

## 2024-04-19 PROCEDURE — P9604 ONE-WAY ALLOW PRORATED TRIP: HCPCS | Performed by: FAMILY MEDICINE

## 2024-04-19 PROCEDURE — 36415 COLL VENOUS BLD VENIPUNCTURE: CPT | Performed by: FAMILY MEDICINE

## 2024-04-21 LAB
GAMMA INTERFERON BACKGROUND BLD IA-ACNC: 0.02 IU/ML
M TB IFN-G BLD-IMP: ABNORMAL
M TB IFN-G CD4+ BCKGRND COR BLD-ACNC: 0.06 IU/ML
MITOGEN IGNF BCKGRD COR BLD-ACNC: 0 IU/ML
MITOGEN IGNF BCKGRD COR BLD-ACNC: 0 IU/ML
QUANTIFERON MITOGEN: 0.08 IU/ML
QUANTIFERON NIL TUBE: 0.02 IU/ML
QUANTIFERON TB1 TUBE: 0.02 IU/ML
QUANTIFERON TB2 TUBE: 0.02

## 2024-04-23 ENCOUNTER — DOCUMENTATION ONLY (OUTPATIENT)
Dept: OTHER | Facility: CLINIC | Age: 89
End: 2024-04-23
Payer: COMMERCIAL

## 2024-10-24 ENCOUNTER — LAB REQUISITION (OUTPATIENT)
Dept: LAB | Facility: CLINIC | Age: 89
End: 2024-10-24
Payer: COMMERCIAL

## 2024-10-24 DIAGNOSIS — I10 ESSENTIAL (PRIMARY) HYPERTENSION: ICD-10-CM

## 2024-10-25 LAB
ANION GAP SERPL CALCULATED.3IONS-SCNC: 14 MMOL/L (ref 7–15)
BUN SERPL-MCNC: 14.4 MG/DL (ref 8–23)
CALCIUM SERPL-MCNC: 8.9 MG/DL (ref 8.8–10.4)
CHLORIDE SERPL-SCNC: 102 MMOL/L (ref 98–107)
CREAT SERPL-MCNC: 0.78 MG/DL (ref 0.51–0.95)
EGFRCR SERPLBLD CKD-EPI 2021: 73 ML/MIN/1.73M2
GLUCOSE SERPL-MCNC: 86 MG/DL (ref 70–99)
HCO3 SERPL-SCNC: 22 MMOL/L (ref 22–29)
POTASSIUM SERPL-SCNC: 4.5 MMOL/L (ref 3.4–5.3)
SODIUM SERPL-SCNC: 138 MMOL/L (ref 135–145)

## 2024-10-25 PROCEDURE — 36415 COLL VENOUS BLD VENIPUNCTURE: CPT | Mod: ORL | Performed by: FAMILY MEDICINE

## 2024-10-25 PROCEDURE — 80048 BASIC METABOLIC PNL TOTAL CA: CPT | Mod: ORL | Performed by: FAMILY MEDICINE

## 2024-10-25 PROCEDURE — P9603 ONE-WAY ALLOW PRORATED MILES: HCPCS | Mod: ORL | Performed by: FAMILY MEDICINE

## 2024-11-05 ENCOUNTER — LAB REQUISITION (OUTPATIENT)
Dept: LAB | Facility: CLINIC | Age: 89
End: 2024-11-05
Payer: COMMERCIAL

## 2024-11-05 DIAGNOSIS — E87.6 HYPOKALEMIA: ICD-10-CM

## 2024-11-06 LAB — POTASSIUM SERPL-SCNC: 4.5 MMOL/L (ref 3.4–5.3)

## 2024-11-06 PROCEDURE — 84132 ASSAY OF SERUM POTASSIUM: CPT | Mod: ORL | Performed by: NURSE PRACTITIONER

## 2024-11-06 PROCEDURE — 36415 COLL VENOUS BLD VENIPUNCTURE: CPT | Mod: ORL | Performed by: NURSE PRACTITIONER

## 2024-11-06 PROCEDURE — P9603 ONE-WAY ALLOW PRORATED MILES: HCPCS | Mod: ORL | Performed by: NURSE PRACTITIONER
